# Patient Record
Sex: MALE | Race: WHITE | NOT HISPANIC OR LATINO | Employment: FULL TIME | URBAN - NONMETROPOLITAN AREA
[De-identification: names, ages, dates, MRNs, and addresses within clinical notes are randomized per-mention and may not be internally consistent; named-entity substitution may affect disease eponyms.]

---

## 2017-05-04 ENCOUNTER — COMMUNICATION - GICH (OUTPATIENT)
Dept: FAMILY MEDICINE | Facility: OTHER | Age: 52
End: 2017-05-04

## 2017-05-04 DIAGNOSIS — J30.2 OTHER SEASONAL ALLERGIC RHINITIS: ICD-10-CM

## 2017-06-09 ENCOUNTER — COMMUNICATION - GICH (OUTPATIENT)
Dept: FAMILY MEDICINE | Facility: OTHER | Age: 52
End: 2017-06-09

## 2017-06-09 DIAGNOSIS — Z91.09 OTHER ALLERGY STATUS, OTHER THAN TO DRUGS AND BIOLOGICAL SUBSTANCES: ICD-10-CM

## 2017-09-07 ENCOUNTER — COMMUNICATION - GICH (OUTPATIENT)
Dept: FAMILY MEDICINE | Facility: OTHER | Age: 52
End: 2017-09-07

## 2017-09-07 DIAGNOSIS — Z91.09 OTHER ALLERGY STATUS, OTHER THAN TO DRUGS AND BIOLOGICAL SUBSTANCES: ICD-10-CM

## 2017-10-06 ENCOUNTER — AMBULATORY - GICH (OUTPATIENT)
Dept: FAMILY MEDICINE | Facility: OTHER | Age: 52
End: 2017-10-06

## 2017-10-06 DIAGNOSIS — Z23 ENCOUNTER FOR IMMUNIZATION: ICD-10-CM

## 2017-11-19 ENCOUNTER — COMMUNICATION - GICH (OUTPATIENT)
Dept: FAMILY MEDICINE | Facility: OTHER | Age: 52
End: 2017-11-19

## 2017-11-19 DIAGNOSIS — I10 ESSENTIAL (PRIMARY) HYPERTENSION: ICD-10-CM

## 2017-11-19 DIAGNOSIS — E78.5 HYPERLIPIDEMIA: ICD-10-CM

## 2017-11-30 ENCOUNTER — COMMUNICATION - GICH (OUTPATIENT)
Dept: FAMILY MEDICINE | Facility: OTHER | Age: 52
End: 2017-11-30

## 2017-11-30 DIAGNOSIS — Z91.09 OTHER ALLERGY STATUS, OTHER THAN TO DRUGS AND BIOLOGICAL SUBSTANCES: ICD-10-CM

## 2017-12-12 ENCOUNTER — HISTORY (OUTPATIENT)
Dept: FAMILY MEDICINE | Facility: OTHER | Age: 52
End: 2017-12-12

## 2017-12-12 ENCOUNTER — OFFICE VISIT - GICH (OUTPATIENT)
Dept: FAMILY MEDICINE | Facility: OTHER | Age: 52
End: 2017-12-12

## 2017-12-12 DIAGNOSIS — E78.5 HYPERLIPIDEMIA: ICD-10-CM

## 2017-12-12 DIAGNOSIS — R35.0 FREQUENCY OF MICTURITION: ICD-10-CM

## 2017-12-12 DIAGNOSIS — Z23 ENCOUNTER FOR IMMUNIZATION: ICD-10-CM

## 2017-12-12 DIAGNOSIS — I10 ESSENTIAL (PRIMARY) HYPERTENSION: ICD-10-CM

## 2017-12-12 DIAGNOSIS — Z12.5 ENCOUNTER FOR SCREENING FOR MALIGNANT NEOPLASM OF PROSTATE: ICD-10-CM

## 2017-12-12 DIAGNOSIS — Z91.09 OTHER ALLERGY STATUS, OTHER THAN TO DRUGS AND BIOLOGICAL SUBSTANCES: ICD-10-CM

## 2017-12-12 LAB
ANION GAP - HISTORICAL: 8 (ref 5–18)
BUN SERPL-MCNC: 12 MG/DL (ref 7–25)
BUN/CREAT RATIO - HISTORICAL: 14
CALCIUM SERPL-MCNC: 9.1 MG/DL (ref 8.6–10.3)
CHLORIDE SERPLBLD-SCNC: 105 MMOL/L (ref 98–107)
CHOL/HDL RATIO - HISTORICAL: 2.32
CHOLESTEROL TOTAL: 195 MG/DL
CO2 SERPL-SCNC: 26 MMOL/L (ref 21–31)
CREAT SERPL-MCNC: 0.88 MG/DL (ref 0.7–1.3)
GFR IF NOT AFRICAN AMERICAN - HISTORICAL: >60 ML/MIN/1.73M2
GLUCOSE SERPL-MCNC: 94 MG/DL (ref 70–105)
HDLC SERPL-MCNC: 84 MG/DL (ref 23–92)
LDLC SERPL CALC-MCNC: 96 MG/DL
NON-HDL CHOLESTEROL - HISTORICAL: 111 MG/DL
POTASSIUM SERPL-SCNC: 4.1 MMOL/L (ref 3.5–5.1)
PROVIDER ORDERDED STATUS - HISTORICAL: NORMAL
PSA TOTAL (DIAGNOSTIC) - HISTORICAL: 1.73 NG/ML
SODIUM SERPL-SCNC: 139 MMOL/L (ref 133–143)
TRIGL SERPL-MCNC: 74 MG/DL

## 2017-12-19 ENCOUNTER — COMMUNICATION - GICH (OUTPATIENT)
Dept: FAMILY MEDICINE | Facility: OTHER | Age: 52
End: 2017-12-19

## 2017-12-28 NOTE — TELEPHONE ENCOUNTER
Patient Information     Patient Name MRN Deandre Miller [id number] Male 1965      Telephone Encounter by Irineo Hearn RN at 2017  4:38 PM     Author:  Irineo Hearn RN Service:  (none) Author Type:  NURS- Registered Nurse     Filed:  2017  4:40 PM Encounter Date:  2017 Status:  Signed     :  Irineo Hearn RN (NURS- Registered Nurse)            Asthma/COPD    Office visit in the past 12 months.    Last visit with LETTY CRENSHAW was on: 10/28/2016 in [facility]  Next visit with LETTY CRENSHAW is on: 2017 in [facility]  Next visit with Family Practice is on: 2017 in [facility]    Max refills 12 months from last office visit.    Chart review shows that patient is being seen for annual medication management office visit with PCP on 17. Writer will refill rx as requested to get patient through his appointment date as noted.    Prescription refilled per RN Medication Refill Policy.................... Irineo Hearn RN ....................  2017   4:39 PM

## 2017-12-28 NOTE — TELEPHONE ENCOUNTER
Patient Information     Patient Name MRN Deandre Miller [id number] Male 1965      Telephone Encounter by Irineo Hearn RN at 2017  8:21 AM     Author:  Irineo Hearn RN Service:  (none) Author Type:  NURS- Registered Nurse     Filed:  2017  8:25 AM Encounter Date:  2017 Status:  Signed     :  Irineo Hearn RN (NURS- Registered Nurse)            Asthma/COPD    Office visit in the past 12 months.    Last visit with LETTY CRENSHAW was on: 10/28/2016 in [facility]  Next visit with LETTY CRENSHAW is on: No future appointment listed with this provider  Next visit with Family Practice is on: No future appointment listed in this department    Max refills 12 months from last office visit.    Chart review shows that patient was seen by PCP last on 10/28/16 for a physical. Use of singulair was noted at that office visit with PCP, as well as continued use as rx was renewed at that time. Writer will refill rx as requested at this time.    Prescription refilled per RN Medication Refill Policy.................... Irineo Hearn RN ....................  2017   8:24 AM

## 2017-12-28 NOTE — TELEPHONE ENCOUNTER
Patient Information     Patient Name MRN Deandre Miller [id number] Male 1965      Telephone Encounter by Irineo Carreon RN at 2017  3:51 PM     Author:  Irineo Carreon RN Service:  (none) Author Type:  NURS- Registered Nurse     Filed:  2017  3:54 PM Encounter Date:  2017 Status:  Signed     :  Irineo Carreon RN (NURS- Registered Nurse)            Redundant Refill Request for Singulair refused;    Prescribing Provider: Ayush Gillette MD             Order Date: 2017  Ordered by: IRINEO CARREON  Medication:montelukast (SINGULAIR) 10 mg tablet    Qty:90 tablet   Ref:1  Start:2017    End:              Route:Oral                  RHIANNON:No   Class:eRx    Sig:Take 1 tablet by mouth at bedtime.    Pharmacy:[id number] ([facility]) [address]              [address]    Cosign accepted by AYUSH GILLETTE[[id number]] on 2017  8:44 AM    Unable to complete prescription refill per RN Medication Refill Policy.................... Irineo Carreon RN ....................  2017   3:51 PM

## 2017-12-28 NOTE — TELEPHONE ENCOUNTER
Patient Information     Patient Name MRDeandre Dawson [id number] Male 1965      Telephone Encounter by Kendra Coy RN at 2017  3:09 PM     Author:  Kendra Coy RN Service:  (none) Author Type:  NURS- Registered Nurse     Filed:  2017  3:10 PM Encounter Date:  2017 Status:  Signed     :  Kendra Coy RN (NURS- Registered Nurse)            Statins    Office visit in the past 12 months.    Last visit with LETTY CRENSHAW was on: [facility]/28/[facility]  Next visit with LETTY CRENSHAW is on: No future appointment listed with this provider  Next visit with [facility] is on: No future appointment listed in this department    Lab testing requirements:  Lipids annually.  Repeat lipids 6-8 weeks after dosage or drug change.    Last Lipids:  Chol: 198    10/28/2016  T    10/28/2016  HDL:   78    10/28/2016  LDL:  105    10/28/2016  LDL DIRECT:  No results found in past 5 years    .    Concommitant use of fibrates and statins-If it is an addition to the medication list, review note and/or discuss with provider.  If already on medication list, refill.    Max refills 12 months from last office visit.      Beta Blockers     Office visit in the past 12 months or per provider note.    Last visit with LETTY CRENSHAW was on: 10/28/2016 in [facility]  Next visit with LETTY CRENSHAW is on: No future appointment listed with this provider  Next visit with [facility] is on: No future appointment listed in this department    Max refill for 12 months from last office visit or per provider note.      Due for exam and annual labs.  Limited refill per protocol and letter mailed.  KENDRA COY RN ........   2017    3:09 PM

## 2018-01-04 NOTE — TELEPHONE ENCOUNTER
Patient Information     Patient Name MRN Deandre Miller [id number] Male 1965      Telephone Encounter by Irineo Hearn RN at 2017  1:39 PM     Author:  Irineo Hearn RN Service:  (none) Author Type:  NURS- Registered Nurse     Filed:  2017  1:50 PM Encounter Date:  2017 Status:  Signed     :  Irineo Hearn RN (NURS- Registered Nurse)            Antihistamines:    Office visit in the past 12 months.    Last visit with LETTY CRENSHAW was on: 10/28/2016 in [facility]  Next visit with LETTY CRENSHAW is on: No future appointment listed with this provider  Next visit with Family Practice is on: No future appointment listed in this department    Max refills 12 months from last office visit.    Chart review shows that last office visit with PCP was noted to be on 10/28/16 for a physical. Medications, including claritin were reviewed by PCP at that office visit. No noted change with regards to claritin noted. Will refill rx as requested.    Prescription refilled per RN Medication Refill Policy.................... Irineo Hearn RN ....................  2017   1:49 PM

## 2018-01-17 PROBLEM — G47.30 SLEEP APNEA: Status: ACTIVE | Noted: 2018-01-17

## 2018-01-17 RX ORDER — METOPROLOL SUCCINATE 100 MG/1
100 TABLET, EXTENDED RELEASE ORAL
COMMUNITY
Start: 2017-12-15 | End: 2018-12-14

## 2018-01-17 RX ORDER — MONTELUKAST SODIUM 10 MG/1
10 TABLET ORAL
COMMUNITY
Start: 2017-12-12 | End: 2018-11-14

## 2018-01-17 RX ORDER — SIMVASTATIN 10 MG
10 TABLET ORAL
COMMUNITY
Start: 2017-12-12 | End: 2018-12-14

## 2018-01-17 RX ORDER — ASPIRIN 81 MG/1
81 TABLET ORAL
COMMUNITY
Start: 2013-01-18

## 2018-01-17 RX ORDER — TAMSULOSIN HYDROCHLORIDE 0.4 MG/1
0.4 CAPSULE ORAL
COMMUNITY
Start: 2017-12-19 | End: 2018-12-14

## 2018-02-09 VITALS
SYSTOLIC BLOOD PRESSURE: 142 MMHG | HEIGHT: 72 IN | WEIGHT: 250 LBS | HEART RATE: 76 BPM | BODY MASS INDEX: 33.86 KG/M2 | DIASTOLIC BLOOD PRESSURE: 90 MMHG

## 2018-02-12 NOTE — PROGRESS NOTES
Patient Information     Patient Name MRN Sex Deandre Hobbs [id number] Male 1965      Progress Notes by Ayush Gillette MD at 2017  8:30 AM     Author:  Ayush Gillette MD Service:  (none) Author Type:  Physician     Filed:  12/15/2017  6:56 AM Encounter Date:  2017 Status:  Signed     :  Ayush Gillette MD (Physician)            SUBJECTIVE:    Deandre Smyth is a 52 y.o. male who presents for physical exam    HPI: Patient reports that he feels well. He is here to follow-up on hypertension, hyperlipidemia and allergies. He has not had any chest pain. He denies any shortness of breath. He reports no exertional indigestion or other anginal equivalents.    He has noticed a little bit of urinary hesitancy only in the morning. No nocturia. No frequency during the day. No incontinence.    PROBLEM LIST:  Patient Active Problem List     Diagnosis  Code     ESSENTIAL HYPERTENSION I10     DERMATITIS, PERIORAL L71.9     ALLERGIC RHINITIS J30.9     SLEEP APNEA G47.30     Hyperlipidemia LDL goal < 130 E78.5     PAST MEDICAL HISTORY:  Past Medical History:     Diagnosis  Date     Rotator cuff tear, left      Sleep apnea      SURGICAL HISTORY:  Past Surgical History:      Procedure  Laterality Date     COLONOSCOPY SCREENING  2010    normal; follow up 10 years       SHOULDER ARTHROSCOPY  04    Right shoulder surgery, due to biceps tendon tear, Dr. Burden        SHOULDER ARTHROSCOPY  2010    Left shoulder surgery- Sukhdeep.         SOCIAL HISTORY:  Social History     Social History        Marital status:       Spouse name: N/A     Number of children:  N/A     Years of education:  N/A     Occupational History      Not on file.     Social History Main Topics        Smoking status:  Never Smoker     Smokeless tobacco:  Current User     Types: Chew     Alcohol use  Not on file     Drug use:  Not on file     Sexual activity:  Not on file     Other Topics  Concern     Not on  file      Social History Narrative     .  Nonsmoker.      Occasional alcohol, couple times a week has a couple beers.      No drug use.    .  Has 24y/o son and 21y/o daughter.             FAMILY HISTORY:  Family History       Problem   Relation Age of Onset     Other  Mother      Alzheimer's disease,  at age 75       Other  Father      BPH, cancer of the prostate in his seventies.       Hypertension  Father      Sepsis after surgery. - 81 y/o       No Known Problems  Sister      No Known Problems  Brother      Heart Disease  No Family History      No known premature CAD or DM.       Diabetes  No Family History      No known premature CAD or DM.        CURRENT MEDICATIONS:   Current Outpatient Prescriptions       Medication  Sig Dispense Refill     aspirin enteric coated 81 mg tablet Take 1 tablet by mouth once daily with a meal.  0     CPAP CPAP tubing. Selligy REMstar pro c-flex 1 unit 0     metoprolol succinate (TOPROL XL) 50 mg sustained-release tablet Take 1 tablet by mouth once daily. 90 tablet 3     montelukast (SINGULAIR) 10 mg tablet Take 1 tablet by mouth at bedtime. 90 tablet 3     simvastatin (ZOCOR) 10 mg tablet Take 1 tablet by mouth at bedtime. 90 tablet 3     No current facility-administered medications for this visit.      Medications have been reviewed by me and are current to the best of my knowledge and ability.    ALLERGIES:  Pollen extracts    REVIEW OF SYSTEMS:  General: denies any general problems.  Eyes: denies problems  Ears/Nose/Throat: denies problems  Cardiovascular: denies problems  Respiratory: denies problems  Gastrointestinal: denies problems  Genitourinary: See history of present illness  Musculoskeletal: denies problems  Skin: denies problems  Neurologic: denies problems  Psychiatric: denies problems  Endocrine: denies problems  Heme/Lymphatic: denies problems  Allergic/Immunologic: denies problems  PHQ Depression Screening 2017    Date of PHQ exam (doc flow) 12/12/2017   1. Lack of interest/pleasure 0 - Not at all   2. Feeling down/depressed 0 - Not at all   PHQ-2 TOTAL SCORE 0   3. Trouble sleeping -   4. Decreased energy -   5. Appetite change -   6. Feelings of failure -   7. Trouble concentrating -   8. Activity level -   9. Hurting yourself -   PHQ-9 TOTAL SCORE -   PHQ-9 Severity Level -   Some recent data might be hidden       OBJECTIVE:  /90  Pulse 76  Ht 1.829 m (6')  Wt 113.4 kg (250 lb)  BMI 33.91 kg/m2  EXAM:   General Appearance: Pleasant, alert, appropriate appearance for age. No acute distress  Head Exam: Normal. Normocephalic, atraumatic.  Eye Exam:  Normal external eye, conjunctiva, lids, cornea. LICO.  Fundoscopic Exam: Normal red reflex and fundoscopic exam.  Ear Exam: Normal TM's bilaterally. Normal auditory canals and external ears. Non-tender.  Nose Exam: Normal external nose, mucus membranes, and septum.  OroPharynx Exam:  Dental hygiene adequate. Normal buccal mucosa. Normal pharynx.  Neck Exam:  Supple, no masses or nodes.  Thyroid Exam: No nodules or enlargement.  Chest/Respiratory Exam: Normal chest wall and respirations. Clear to auscultation.  Cardiovascular Exam: Regular rate and rhythm. S1, S2, no murmur, click, gallop, or rubs.  Gastrointestinal Exam: Soft, non-tender, no masses or organomegaly.  Rectal Exam: Patient declined and understands risks of this approach  Lymphatic Exam: Non-palpable nodes in neck, clavicular, axillary, or inguinal regions.  Musculoskeletal Exam: Back is straight and non-tender, full ROM of upper and lower extremities.  Foot Exam: Left and right foot: good pedal pulses, no lesions, nail hygiene good.  Skin: no rash or abnormalities  Neurologic Exam: Nonfocal, symmetric DTRs, normal gross motor, tone coordination and no tremor.  Psychiatric Exam: Alert and oriented - appropriate affect.    Results for orders placed or performed in visit on 12/12/17      LIPID PANEL       Result  Value Ref Range    CHOLESTEROL,TOTAL 195 <200 mg/dL    TRIGLYCERIDES 74 <150 mg/dL    HDL CHOLESTEROL 84 23 - 92 mg/dL    NON-HDL CHOLESTEROL 111 <145 mg/dl    CHOL/HDL RATIO            2.32 <4.50                    LDL CHOLESTEROL 96 <100 mg/dL    PROVIDER ORDERED STATUS FASTING    BASIC METABOLIC PANEL      Result  Value Ref Range    SODIUM 139 133 - 143 mmol/L    POTASSIUM 4.1 3.5 - 5.1 mmol/L    CHLORIDE 105 98 - 107 mmol/L    CO2,TOTAL 26 21 - 31 mmol/L    ANION GAP 8 5 - 18                    GLUCOSE 94 70 - 105 mg/dL    CALCIUM 9.1 8.6 - 10.3 mg/dL    BUN 12 7 - 25 mg/dL    CREATININE 0.88 0.70 - 1.30 mg/dL    BUN/CREAT RATIO           14                    GFR if African American >60 >60 ml/min/1.73m2    GFR if not African American >60 >60 ml/min/1.73m2   PSA, TOTAL      Result  Value Ref Range    PSA TOTAL (DIAGNOSTIC) 1.733 <=3.100 ng/mL         ASSESSMENT/PLAN:    ICD-10-CM    1. Prostate cancer screening Z12.5 PSA, TOTAL      PSA, TOTAL - Discussed risks of false positives and benefits of early detection with LESLIE and PSA. Suggest both  modalities.  He declines LESLIE but does accept PSA.  He understands limitations of this approach and that about 5% of prostate cancers do not produce PSA, making them undetectable by this method alone.  He voiced good understanding.  PSA did return reassuring. His urinary hesitancy only in the morning could be related to prostate but not necessarily. If it is worsening or he develops any new symptoms he'll come back in for reevaluation     2. Essential hypertension I10 BASIC METABOLIC PANEL      BASIC METABOLIC PANEL      metoprolol succinate (TOPROL XL) 100 mg Sustained-Release tablet - we'll increase Toprol to 100 mg daily. Patient will come back in for nursing blood pressure check       DISCONTINUED: metoprolol succinate (TOPROL XL) 50 mg sustained-release tablet   3. Environmental allergies Z91.09 montelukast (SINGULAIR) 10 mg tablet - continue Singulair    4.  Hyperlipidemia with target LDL less than 130 E78.5 simvastatin (ZOCOR) 10 mg tablet      LIPID PANEL      LIPID PANEL   5. Need for prophylactic vaccination with combined diphtheria-tetanus-pertussis (DTaP) vaccine Z23 TDAP VACCINE IM      VT ADMIN VACC INITIAL     Physical exam - patient overall is doing quite well. Suggest cessation from chewing tobacco. Patient also would benefit from goal weight loss of about 25 pounds over the next 6 months. Stressed importance of low-salt diet and regular exercise to help control blood pressure. Continue on current medications as noted above.  Repeat colonoscopy in 2020.      BP Readings from Last 1 Encounters:12/12/17 : 142/90  Mr. Umanzor blood pressure is out of the normal range for adults. Per JNC-8 guidelines normal adult blood pressure is < 120/80, pre-hypertensive is between 120/80 and 139/89, and hypertension is 140/90 or greater. Risks of hypertension were discussed. Patient's strategy will be weight loss, increased activity, reduced salt intake and to recheck blood pressure in 1 months

## 2018-02-12 NOTE — TELEPHONE ENCOUNTER
Patient Information     Patient Name MRN Sex Deandre Hobbs [id number] Male 1965      Telephone Encounter by Ayush Gillette MD at 2017 12:14 PM     Author:  Ayush Gillette MD Service:  (none) Author Type:  Physician     Filed:  2017 12:14 PM Encounter Date:  2017 Status:  Signed     :  Ayush Gillette MD (Physician)            Rx sent

## 2018-02-12 NOTE — ADDENDUM NOTE
Patient Information     Patient Name MRN Deandre Miller [id number] Male 1965      Addendum Note by Ayush Crenshaw MD at 2017 12:13 PM     Author:  Ayush Crenshaw MD Service:  (none) Author Type:  Physician     Filed:  2017 12:13 PM Encounter Date:  2017 Status:  Signed     :  Ayush Crenshaw MD (Physician)       Addended by: AYUSH CRENSHAW on: 2017 12:13 PM        Modules accepted: Orders

## 2018-02-12 NOTE — TELEPHONE ENCOUNTER
Patient Information     Patient Name MRDeandre Dawson [id number] Male 1965      Telephone Encounter by Ann Barrera at 2017  9:20 AM     Author:  Ann Barrera Service:  (none) Author Type:  (none)     Filed:  2017  9:23 AM Encounter Date:  2017 Status:  Signed     :  Ann Barrera            Patient got a letter from  stating they were sending a prescription over to [facility] pharmacy for Flomax. They have not received the prescription at this time.

## 2018-03-22 ENCOUNTER — HOSPITAL ENCOUNTER (OUTPATIENT)
Dept: GENERAL RADIOLOGY | Facility: OTHER | Age: 53
Discharge: HOME | End: 2018-03-22
Attending: FAMILY MEDICINE | Admitting: FAMILY MEDICINE
Payer: COMMERCIAL

## 2018-03-22 ENCOUNTER — OFFICE VISIT (OUTPATIENT)
Dept: FAMILY MEDICINE | Facility: OTHER | Age: 53
End: 2018-03-22
Attending: FAMILY MEDICINE
Payer: COMMERCIAL

## 2018-03-22 VITALS
BODY MASS INDEX: 32.1 KG/M2 | HEIGHT: 73 IN | WEIGHT: 242.2 LBS | SYSTOLIC BLOOD PRESSURE: 128 MMHG | TEMPERATURE: 98.1 F | DIASTOLIC BLOOD PRESSURE: 70 MMHG

## 2018-03-22 DIAGNOSIS — R10.31 GROIN PAIN, RIGHT: Primary | ICD-10-CM

## 2018-03-22 DIAGNOSIS — R10.31 GROIN PAIN, RIGHT: ICD-10-CM

## 2018-03-22 PROCEDURE — 99213 OFFICE O/P EST LOW 20 MIN: CPT | Performed by: FAMILY MEDICINE

## 2018-03-22 PROCEDURE — 73502 X-RAY EXAM HIP UNI 2-3 VIEWS: CPT

## 2018-03-22 ASSESSMENT — ANXIETY QUESTIONNAIRES
1. FEELING NERVOUS, ANXIOUS, OR ON EDGE: NOT AT ALL
6. BECOMING EASILY ANNOYED OR IRRITABLE: NOT AT ALL
3. WORRYING TOO MUCH ABOUT DIFFERENT THINGS: NOT AT ALL
7. FEELING AFRAID AS IF SOMETHING AWFUL MIGHT HAPPEN: NOT AT ALL
IF YOU CHECKED OFF ANY PROBLEMS ON THIS QUESTIONNAIRE, HOW DIFFICULT HAVE THESE PROBLEMS MADE IT FOR YOU TO DO YOUR WORK, TAKE CARE OF THINGS AT HOME, OR GET ALONG WITH OTHER PEOPLE: NOT DIFFICULT AT ALL
2. NOT BEING ABLE TO STOP OR CONTROL WORRYING: NOT AT ALL
5. BEING SO RESTLESS THAT IT IS HARD TO SIT STILL: NOT AT ALL
GAD7 TOTAL SCORE: 0

## 2018-03-22 ASSESSMENT — ENCOUNTER SYMPTOMS
DIFFICULTY URINATING: 0
ABDOMINAL PAIN: 0
FEVER: 0

## 2018-03-22 ASSESSMENT — PATIENT HEALTH QUESTIONNAIRE - PHQ9: 5. POOR APPETITE OR OVEREATING: NOT AT ALL

## 2018-03-22 ASSESSMENT — PAIN SCALES - GENERAL: PAINLEVEL: NO PAIN (0)

## 2018-03-22 NOTE — PROGRESS NOTES
SUBJECTIVE:   Deandre Smyth is a 52 year old male who presents to clinic today for the following health issues:    HPI    Is working as a winkler.  Has noted some pains in the last 6 weeks or so in the groin.  Seems to come on on the way home, after driving for over 1 hour.  Sharp pain in right groin with getting out of the vehicle.  At times will be severe with just walking a few steps.  Will improve and barely be noticeable.  Happens 2-3 times a week.  No bulging in the area.  No know hip troubles.  No catching in the hip joint.    Patient Active Problem List    Diagnosis Date Noted     Sleep apnea 01/17/2018     Priority: Medium     Hyperlipidemia with target low density lipoprotein (LDL) cholesterol less than 130 mg/dL 09/21/2015     Priority: Medium     Allergic rhinitis 12/05/2011     Priority: Medium     Rosacea 12/09/2010     Priority: Medium     Essential hypertension 12/09/2010     Priority: Medium     Past Surgical History:   Procedure Laterality Date     ARTHROSCOPY SHOULDER      7/1/04,Right shoulder surgery, due to biceps tendon tear, Dr. Burden     ARTHROSCOPY SHOULDER      9/2010,Left shoulder surgery- Baker.     COLONOSCOPY      12/2010,normal; follow up 10 years     Social History   Substance Use Topics     Smoking status: Never Smoker     Smokeless tobacco: Current User     Types: Chew     Alcohol use Not on file     Current Outpatient Prescriptions   Medication Sig Dispense Refill     Misc. Devices KIT CPAP tubing. Lateral SV RespirGetJars REMstar pro c-flex       aspirin EC 81 MG EC tablet Take 81 mg by mouth       metoprolol succinate (TOPROL-XL) 100 MG 24 hr tablet Take 100 mg by mouth       tamsulosin (FLOMAX) 0.4 MG capsule Take 0.4 mg by mouth       simvastatin (ZOCOR) 10 MG tablet Take 10 mg by mouth       montelukast (SINGULAIR) 10 MG tablet Take 10 mg by mouth       Allergies   Allergen Reactions     Pollen Extract      Other reaction(s): Runny Nose       Review of Systems  "  Constitutional: Negative for fever.   Gastrointestinal: Negative for abdominal pain.   Genitourinary: Negative for difficulty urinating, penile pain and scrotal swelling.        OBJECTIVE:     /70 (BP Location: Right arm, Patient Position: Sitting, Cuff Size: Adult Regular)  Temp 98.1  F (36.7  C) (Temporal)  Ht 6' 0.5\" (1.842 m)  Wt 242 lb 3.2 oz (109.9 kg)  BMI 32.4 kg/m2  Body mass index is 32.4 kg/(m^2).  Physical Exam   Constitutional: He is oriented to person, place, and time. He appears well-developed and well-nourished. No distress.   Genitourinary:   Genitourinary Comments: No scrotal pain, negative hernia on valsalva.  Normal  exam   Musculoskeletal:   Right hip with near full flexion.  Significant loss of internal rotation, about 15 degrees or so.   Neurological: He is alert and oriented to person, place, and time.   Skin: Skin is warm and dry. No rash noted. He is not diaphoretic. No erythema.   Psychiatric: He has a normal mood and affect. His behavior is normal. Thought content normal.       Diagnostic Test Results:  X-ray is normal.    ASSESSMENT/PLAN:         (R10.31) Groin pain, right  (primary encounter diagnosis)  Comment: progressive  Plan: XR Hip Right 2-3 Views        I wonder if this is a labral tear or loose body of the hip.  Exam is consistent with only a loss of range of motion, no hernia noted.  Will proceed with an MRI.        Estiven Caicedo MD  [facility]    "

## 2018-03-22 NOTE — MR AVS SNAPSHOT
"              After Visit Summary   3/22/2018    Deandre Smyth    MRN: [id number]           Patient Information     Date Of Birth          1965        Visit Information        Provider Department      3/22/2018 12:45 PM Estiven Caicedo MD [facility] and [facility]        Today's Diagnoses     Groin pain, right    -  1       Follow-ups after your visit        Follow-up notes from your care team     Return if symptoms worsen or fail to improve.      Future tests that were ordered for you today     Open Future Orders        Priority Expected Expires Ordered    MR Hip Right w/o Contrast Routine  3/22/2019 3/22/2018    XR Pelvis w Hip Right G/E 2 Views Routine 3/22/2018 3/22/2019 3/22/2018            Who to contact     If you have questions or need follow up information about today's clinic visit or your schedule please contact [facility] AND [facility] directly at [phone].  Normal or non-critical lab and imaging results will be communicated to you by Urban Remedyhart, letter or phone within 4 business days after the clinic has received the results. If you do not hear from us within 7 days, please contact the clinic through [employer] Remedyhart or phone. If you have a critical or abnormal lab result, we will notify you by phone as soon as possible.  Submit refill requests through Eloqua or call your pharmacy and they will forward the refill request to us. Please allow 3 business days for your refill to be completed.          Additional Information About Your Visit        MyChart Information     Eloqua lets you send messages to your doctor, view your test results, renew your prescriptions, schedule appointments and more. To sign up, go to [email or web address] . Click on \"Log in\" on the left side of the screen, which will take you to the Welcome page. Then click on \"Sign up Now\" on the right side of the page.     You will be asked to enter the access code listed below, as well as some personal information. Please " "follow the directions to create your username and password.     Your access code is: [email or web address]  Expires: 2018  1:33 PM     Your access code will  in 90 days. If you need help or a new code, please call your [place] clinic or [phone].        Care EveryWhere ID     This is your Care EveryWhere ID. This could be used by other organizations to access your [place] medical records  [id number]        Your Vitals Were     Temperature Height BMI (Body Mass Index)             98.1  F (36.7  C) (Temporal) 6' 0.5\" (1.842 m) 32.4 kg/m2          Blood Pressure from Last 3 Encounters:   18 128/70   17 142/90   10/28/16 132/86    Weight from Last 3 Encounters:   18 242 lb 3.2 oz (109.9 kg)   17 250 lb (113.4 kg)   10/28/16 258 lb (117 kg)               Primary Care Provider Office Phone # Fax #    Ayush Gillette [phone] [address] [facility] [address]        Equal Access to Services     [facility]: Hadii piedad khano Soleydiali, waaxda luqadaha, qaybta kaalmada adeegyada, ynes martinez. So [facility] [phone].    ATENCIÓN: Si habla español, tiene a guillen disposición servicios gratuitos de asistencia lingüística. Llame al [phone].    We comply with applicable federal civil rights laws and [place] laws. We do not discriminate on the basis of race, color, national origin, age, disability, sex, sexual orientation, or gender identity.            Thank you!     Thank you for choosing [facility] AND [facility]  for your care. Our goal is always to provide you with excellent care. Hearing back from our patients is one way we can continue to improve our services. Please take a few minutes to complete the written survey that you may receive in the mail after your visit with us. Thank you!             Your Updated Medication List - Protect others around you: Learn how to safely use, store and throw away your " medicines at www.disposemymeds.org.          This list is accurate as of 3/22/18  1:33 PM.  Always use your most recent med list.                   Brand Name Dispense Instructions for use Diagnosis    aspirin EC 81 MG EC tablet      Take 81 mg by mouth        metoprolol succinate 100 MG 24 hr tablet    TOPROL-XL     Take 100 mg by mouth        Misc. Devices Kit      CPAP tubing. [employer]s REMstar pro c-flex        montelukast 10 MG tablet    SINGULAIR     Take 10 mg by mouth        simvastatin 10 MG tablet    ZOCOR     Take 10 mg by mouth        tamsulosin 0.4 MG capsule    FLOMAX     Take 0.4 mg by mouth

## 2018-03-23 ASSESSMENT — ANXIETY QUESTIONNAIRES: GAD7 TOTAL SCORE: 0

## 2018-03-30 ENCOUNTER — HOSPITAL ENCOUNTER (OUTPATIENT)
Dept: MRI IMAGING | Facility: OTHER | Age: 53
Discharge: HOME | End: 2018-03-30
Attending: FAMILY MEDICINE | Admitting: FAMILY MEDICINE
Payer: COMMERCIAL

## 2018-03-30 ENCOUNTER — HOSPITAL ENCOUNTER (OUTPATIENT)
Dept: GENERAL RADIOLOGY | Facility: OTHER | Age: 53
End: 2018-03-30
Attending: RADIOLOGY
Payer: COMMERCIAL

## 2018-03-30 DIAGNOSIS — Z98.890 HISTORY OF METAL REMOVED FROM EYE: ICD-10-CM

## 2018-03-30 DIAGNOSIS — R10.31 GROIN PAIN, RIGHT: ICD-10-CM

## 2018-03-30 PROCEDURE — 73721 MRI JNT OF LWR EXTRE W/O DYE: CPT | Mod: RT

## 2018-03-30 PROCEDURE — 70250 X-RAY EXAM OF SKULL: CPT

## 2018-04-03 ENCOUNTER — MYC MEDICAL ADVICE (OUTPATIENT)
Dept: FAMILY MEDICINE | Facility: OTHER | Age: 53
End: 2018-04-03

## 2018-04-04 ENCOUNTER — MYC MEDICAL ADVICE (OUTPATIENT)
Dept: FAMILY MEDICINE | Facility: OTHER | Age: 53
End: 2018-04-04

## 2018-04-04 DIAGNOSIS — M76.891 TENDINITIS OF RIGHT HIP: Primary | ICD-10-CM

## 2018-04-06 NOTE — TELEPHONE ENCOUNTER
I sent him a message about this directly over his Edkimo.  Did he get it?  Estiven Caicedo MD on 4/6/2018 at 8:29 AM

## 2018-04-06 NOTE — TELEPHONE ENCOUNTER
Spoke with patients wife's who confirmed his last name and birth date. They are needing nothing further with the referral patient has made an appointment with [facility].  Elaine Ames LPN 4/6/2018 3:19 PM

## 2018-04-11 ENCOUNTER — TRANSFERRED RECORDS (OUTPATIENT)
Dept: HEALTH INFORMATION MANAGEMENT | Facility: OTHER | Age: 53
End: 2018-04-11

## 2018-07-23 NOTE — PROGRESS NOTES
Patient Information     Patient Name  Deandre Smyth MRN  [id number] Sex  Male   1965      Letter by Ayush Gillette MD at      Author:  Ayush Gillette MD Service:  (none) Author Type:  (none)    Filed:   Encounter Date:  2017 Status:  (Other)         Deandre Smyth  [address] [facility] [address]    2017      Dear Mr. Smyth,      We've gotten results back from the laboratory for the samples you gave in clinic.  Things look great! PSA has actually decreased and your cholesterol is even better.  Im going to send off the flomax to see if it helps with your urination and your blood pressure both.  Contact us with any questions.    I'm sending along your actual numbers so that you will have them for your general interest and your records.       Take Care,   Ayush Gillette MD      Resulted Orders      LIPID PANEL (Collected: 2017  9:30 AM)      Result  Value Ref Range    CHOLESTEROL,TOTAL 195 <200 mg/dL    TRIGLYCERIDES 74 <150 mg/dL    HDL CHOLESTEROL 84 23 - 92 mg/dL    NON-HDL CHOLESTEROL 111 <145 mg/dl    CHOL/HDL RATIO            2.32 <4.50                    LDL CHOLESTEROL 96 <100 mg/dL    PROVIDER ORDERED STATUS FASTING    BASIC METABOLIC PANEL (Collected: 2017  9:30 AM)      Result  Value Ref Range    SODIUM 139 133 - 143 mmol/L    POTASSIUM 4.1 3.5 - 5.1 mmol/L    CHLORIDE 105 98 - 107 mmol/L    CO2,TOTAL 26 21 - 31 mmol/L    ANION GAP 8 5 - 18                    GLUCOSE 94 70 - 105 mg/dL    CALCIUM 9.1 8.6 - 10.3 mg/dL    BUN 12 7 - 25 mg/dL    CREATININE 0.88 0.70 - 1.30 mg/dL    BUN/CREAT RATIO           14                    GFR if African American >60 >60 ml/min/1.73m2    GFR if not African American >60 >60 ml/min/1.73m2   PSA, TOTAL (Collected: 2017  9:30 AM)      Result  Value Ref Range    PSA TOTAL (DIAGNOSTIC) 1.733 <=3.100 ng/mL    Narrative      The percentage of Free PSA can be used to enhance the   differentiation of prostate  cancer from benign prostatic  disease in subjects whose PSA levels are between 4.00 and  10.00 ng/mL.      The DXI PSA assay is a Chemiluminescent Assay.  Assay values obtained with different assay   methods cannot be used interchangeably due to differences  in assay methods and reagent specificity.

## 2018-07-23 NOTE — PROGRESS NOTES
Patient Information     Patient Name  Deandre Smyth MRN  [id number] Sex  Male   1965      Letter by Ayush Gillette MD at      Author:  Ayush Gillette MD Service:  (none) Author Type:  (none)    Filed:   Encounter Date:  2017 Status:  (Other)           Deandre Smyth  [address] [facility] [address]          2017    Dear Mr. Smyth:    A refill of metoprolol succinate (TOPROL XL) 50 mg sustained-release tablet and simvastatin (ZOCOR) 10 mg tablet has been called into your pharmacy.    Additional refills require an office visit with Ayush Gillette MD for annual medication managment and labs.   Please call the clinic at [phone] to schedule your appointment.    If you should require additional refills before your scheduled appointment, please contact your pharmacy and we will refill your medication until that date.      Thank you,    The Refill Nurse  [facility]

## 2018-11-14 DIAGNOSIS — J30.9 ALLERGIC RHINITIS, UNSPECIFIED SEASONALITY, UNSPECIFIED TRIGGER: Primary | ICD-10-CM

## 2018-11-14 NOTE — LETTER
November 16, 2018      Deandre Smyth  [address] [address] [facility] [address]        Dear Deandre,     This letter is to remind you that you are nearly due for your annual exam with Ayush Gillette. Your last comprehensive visit was nearly 12 months ago.    A LIMITED refill of      MONTELUKAST 10MG TABLET    has been called into your pharmacy. Additional refills require you to complete this appointment.    Please call the clinic at [phone] to schedule your appointment.    If you should require additional refills before your scheduled appointment, please contact your pharmacy and we will refill your medication until the date of your appointment.    If you are no longer seeing Ayush Gillette for primary care, please call to let us know. Doing so will remove you from our call/contact list.      Thank you for choosing [facility] and [facility] for your health care needs.    Sincerely,    Refill ISABEL  [facility]

## 2018-11-16 RX ORDER — MONTELUKAST SODIUM 10 MG/1
TABLET ORAL
Qty: 30 TABLET | Refills: 0 | Status: SHIPPED | OUTPATIENT
Start: 2018-11-16 | End: 2018-12-14

## 2018-11-16 NOTE — TELEPHONE ENCOUNTER
[facility] #[id number] sent Rx request for the following:     MONTELUKAST 10MG TABLET  TAKE 1 TABLET (10MG) BY MOUTH AT BEDTIME  Last Written Prescription Date:  12/12/17  Last Fill Quantity: 90,   # refills: 3    Last Office Visit: 3/22/18 (groin pain, [facility]), 12/12/17 (Annual Px, [facility])  Future Office visit: None.    Patient has enough to get through until 12/12/17 and will be due for annual exam, at the same time. Prescription approved per [facility] Refill Protocol for 30 days at this time. Reminder letter sent to Patient to call and schedule appointment. Alicia Dillon RN .............. 11/16/2018  7:54 AM

## 2018-12-14 ENCOUNTER — MYC MEDICAL ADVICE (OUTPATIENT)
Dept: FAMILY MEDICINE | Facility: OTHER | Age: 53
End: 2018-12-14

## 2018-12-14 ENCOUNTER — OFFICE VISIT (OUTPATIENT)
Dept: FAMILY MEDICINE | Facility: OTHER | Age: 53
End: 2018-12-14
Attending: FAMILY MEDICINE
Payer: COMMERCIAL

## 2018-12-14 VITALS
DIASTOLIC BLOOD PRESSURE: 82 MMHG | BODY MASS INDEX: 33.32 KG/M2 | RESPIRATION RATE: 18 BRPM | HEART RATE: 76 BPM | HEIGHT: 72 IN | TEMPERATURE: 97.9 F | SYSTOLIC BLOOD PRESSURE: 128 MMHG | WEIGHT: 246 LBS

## 2018-12-14 DIAGNOSIS — J30.9 ALLERGIC RHINITIS, UNSPECIFIED SEASONALITY, UNSPECIFIED TRIGGER: ICD-10-CM

## 2018-12-14 DIAGNOSIS — E78.5 HYPERLIPIDEMIA LDL GOAL <100: Primary | ICD-10-CM

## 2018-12-14 DIAGNOSIS — N40.1 BENIGN PROSTATIC HYPERPLASIA WITH URINARY FREQUENCY: ICD-10-CM

## 2018-12-14 DIAGNOSIS — I10 BENIGN ESSENTIAL HYPERTENSION: ICD-10-CM

## 2018-12-14 DIAGNOSIS — Z00.00 ROUTINE HISTORY AND PHYSICAL EXAMINATION OF ADULT: ICD-10-CM

## 2018-12-14 DIAGNOSIS — R35.0 BENIGN PROSTATIC HYPERPLASIA WITH URINARY FREQUENCY: ICD-10-CM

## 2018-12-14 LAB
ALBUMIN SERPL-MCNC: 4.7 G/DL (ref 3.5–5.7)
ALP SERPL-CCNC: 47 U/L (ref 34–104)
ALT SERPL W P-5'-P-CCNC: 30 U/L (ref 7–52)
ANION GAP SERPL CALCULATED.3IONS-SCNC: 8 MMOL/L (ref 3–14)
AST SERPL W P-5'-P-CCNC: 26 U/L (ref 13–39)
BILIRUB SERPL-MCNC: 0.5 MG/DL (ref 0.3–1)
BUN SERPL-MCNC: 14 MG/DL (ref 7–25)
CALCIUM SERPL-MCNC: 10 MG/DL (ref 8.6–10.3)
CHLORIDE SERPL-SCNC: 103 MMOL/L (ref 98–107)
CHOLEST SERPL-MCNC: 229 MG/DL
CO2 SERPL-SCNC: 30 MMOL/L (ref 21–31)
CREAT SERPL-MCNC: 0.96 MG/DL (ref 0.7–1.3)
GFR SERPL CREATININE-BSD FRML MDRD: 81 ML/MIN/1.7M2
GLUCOSE SERPL-MCNC: 99 MG/DL (ref 70–105)
HDLC SERPL-MCNC: 72 MG/DL (ref 23–92)
LDLC SERPL CALC-MCNC: 146 MG/DL
NONHDLC SERPL-MCNC: 157 MG/DL
POTASSIUM SERPL-SCNC: 4.5 MMOL/L (ref 3.5–5.1)
PROT SERPL-MCNC: 7.8 G/DL (ref 6.4–8.9)
PSA SERPL-ACNC: 2.33 NG/ML
SODIUM SERPL-SCNC: 141 MMOL/L (ref 134–144)
TRIGL SERPL-MCNC: 57 MG/DL

## 2018-12-14 PROCEDURE — 86787 VARICELLA-ZOSTER ANTIBODY: CPT | Performed by: FAMILY MEDICINE

## 2018-12-14 PROCEDURE — 80061 LIPID PANEL: CPT | Performed by: FAMILY MEDICINE

## 2018-12-14 PROCEDURE — 80053 COMPREHEN METABOLIC PANEL: CPT | Performed by: FAMILY MEDICINE

## 2018-12-14 PROCEDURE — G0103 PSA SCREENING: HCPCS | Performed by: FAMILY MEDICINE

## 2018-12-14 PROCEDURE — 99396 PREV VISIT EST AGE 40-64: CPT | Performed by: FAMILY MEDICINE

## 2018-12-14 PROCEDURE — 36415 COLL VENOUS BLD VENIPUNCTURE: CPT | Performed by: FAMILY MEDICINE

## 2018-12-14 RX ORDER — TAMSULOSIN HYDROCHLORIDE 0.4 MG/1
0.4 CAPSULE ORAL DAILY
Qty: 90 CAPSULE | Refills: 4 | Status: SHIPPED | OUTPATIENT
Start: 2018-12-14 | End: 2019-10-04

## 2018-12-14 RX ORDER — METOPROLOL SUCCINATE 100 MG/1
100 TABLET, EXTENDED RELEASE ORAL DAILY
Qty: 90 TABLET | Refills: 4 | Status: SHIPPED | OUTPATIENT
Start: 2018-12-14 | End: 2019-10-04

## 2018-12-14 RX ORDER — SIMVASTATIN 10 MG
10 TABLET ORAL AT BEDTIME
Qty: 90 TABLET | Refills: 4 | Status: SHIPPED | OUTPATIENT
Start: 2018-12-14 | End: 2018-12-18

## 2018-12-14 RX ORDER — MONTELUKAST SODIUM 10 MG/1
TABLET ORAL
Qty: 90 TABLET | Refills: 4 | Status: SHIPPED | OUTPATIENT
Start: 2018-12-14 | End: 2019-10-04

## 2018-12-14 ASSESSMENT — MIFFLIN-ST. JEOR: SCORE: 1984.88

## 2018-12-14 NOTE — NURSING NOTE
No LMP for male patient.  Medication Reconciliation: complete    Jannie Fink LPN  12/14/2018 9:57 AM

## 2018-12-15 LAB — VZV IGG SER QL IA: 4.4 AI (ref 0–0.8)

## 2018-12-16 ENCOUNTER — MYC MEDICAL ADVICE (OUTPATIENT)
Dept: FAMILY MEDICINE | Facility: OTHER | Age: 53
End: 2018-12-16

## 2018-12-18 DIAGNOSIS — E78.5 HYPERLIPIDEMIA LDL GOAL <100: Primary | ICD-10-CM

## 2018-12-18 RX ORDER — ROSUVASTATIN CALCIUM 10 MG/1
10 TABLET, COATED ORAL DAILY
Qty: 90 TABLET | Refills: 3 | Status: SHIPPED | OUTPATIENT
Start: 2018-12-18 | End: 2019-10-04

## 2019-01-14 RX ORDER — ROSUVASTATIN CALCIUM 10 MG/1
10 TABLET, COATED ORAL DAILY
Qty: 90 TABLET | Refills: 3 | Status: SHIPPED | OUTPATIENT
Start: 2019-01-14 | End: 2019-09-30 | Stop reason: ALTCHOICE

## 2019-01-14 NOTE — PROGRESS NOTES
Nursing Notes:   Jannie Fink LPN  12/14/2018 10:00 AM  Signed  Patient comes in to the clinic today for an annual physical.    Jannie Fink LPN  12/14/2018 10:00 AM  Signed  No LMP for male patient.  Medication Reconciliation: complete    Jannie Fink LPN  12/14/2018 9:57 AM        SUBJECTIVE:  Deandre Smyth is a 53 year old male who comes in today for physical/annual exam.  He has no acute concerns but is due for follow-up on hypertension, hyperlipidemia and BPH.  He reports no symptoms of chest pain, shortness of breath, exertional indigestion, numbness or pain in the arm or jaw or any abnormal diaphoresis.  He reports moderate intermittent activity but no regular exercise.  He thinks he is eating fairly well.  Has not had any significant weight loss recently.      Patient denies any recent change in his urination.    He had a normal colonoscopy in 2010.  He denies any change in caliber of stool, black or bloody stool.    Patient Active Problem List   Diagnosis     Allergic rhinitis     Rosacea     Essential hypertension     Hyperlipidemia with target low density lipoprotein (LDL) cholesterol less than 130 mg/dL     Sleep apnea       Current Outpatient Medications   Medication Sig Dispense Refill     aspirin EC 81 MG EC tablet Take 81 mg by mouth       metoprolol succinate ER (TOPROL-XL) 100 MG 24 hr tablet Take 1 tablet (100 mg) by mouth daily 90 tablet 4     Misc. Devices KIT CPAP tubing. [place] REMstar pro c-flex       montelukast (SINGULAIR) 10 MG tablet TAKE 1 TABLET (10MG) BY MOUTH AT BEDTIME 90 tablet 4     tamsulosin (FLOMAX) 0.4 MG capsule Take 1 capsule (0.4 mg) by mouth daily 90 capsule 4     rosuvastatin (CRESTOR) 10 MG tablet Take 1 tablet (10 mg) by mouth daily 90 tablet 3       Past Medical History:   Diagnosis Date     Sleep apnea     No Comments Provided     Tear of left rotator cuff     No Comments Provided       Past Surgical History:   Procedure Laterality Date  "    ARTHROSCOPY SHOULDER      04,Right shoulder surgery, due to biceps tendon tear, Dr. Burden     ARTHROSCOPY SHOULDER      2010,Left shoulder surgery- [facility].     COLONOSCOPY      2010,normal; follow up 10 years       Family History   Problem Relation Age of Onset     Other - See Comments Mother         Alzheimer's disease,  at age 75     Other - See Comments Father         BPH, cancer of the prostate in his seventies.     Hypertension Father         Hypertension,Sepsis after surgery. - 79 y/o     Other - See Comments Sister         No Known Problems     Other - See Comments Brother         No Known Problems     Heart Disease No family hx of         Heart Disease,No known premature CAD or DM.     Diabetes No family hx of         Diabetes,No known premature CAD or DM.       Social History     Social History Narrative    .  Nonsmoker.    Occasional alcohol, couple times a week has a couple beers.    No drug use.  .  Has 24y/o son and 23y/o daughter.       I have personally reviewed and updated above noted social, family and/or past medical history.    10 point ROS of systems including Constitutional, Eyes, ENT, Respiratory, Cardiovascular, Gastrointestinal, Genitourinary, Integumentary, Muscularskeletal, Psychiatric were all negative except for pertinent positives noted in my HPI.      /82   Pulse 76   Temp 97.9  F (36.6  C)   Resp 18   Ht 1.822 m (5' 11.75\")   Wt 111.6 kg (246 lb)   BMI 33.60 kg/m      Exam:  Constitutional: healthy, alert and no distress.  Overweight.  HEENT:  NCAT, EOMI, PERRLA, TMs nl.  EAC nl.  OP/NP nl. Thyroid palpated without enlargement or nodularity.  No lymphadenopathy.  Cardiovascular: Regular rate and rhythm.  No murmurs rubs or gallops heard.   No JVD. No lower extremity edema.  Respiratory: Clear to ascultation bilaterally.  No increased respiratory effort.   Gastrointestinal: Abdomen Soft, non-tender.  No masses, rebound or guarding. "   Genitourinary: Patient declined LESLIE.  Msk: No synovitis.  Muscle strength age and body habitus appropriateas well as equal and even.   Neuro: Normal gait and balance.  CN2-12 in tact.  No loss of sensation. Reflexes are symmetric.      Skin: No changing moles or concerning lesion.  No rashes.  No abnormal bruising.  Lymph:  No lymphadenopathy in axillae, supraclavicular or inguinal regions  Psychiatric: mentation appears normal and affect normal/bright      ASSESSMENT/Plan :    I personally reviewed the patients' labs     Results for orders placed or performed in visit on 12/14/18   Varicella Zoster Virus Antibody IgG   Result Value Ref Range    Varicella Zoster Virus Antibody IgG 4.4 (H) 0.0 - 0.8 AI   Comprehensive metabolic panel   Result Value Ref Range    Sodium 141 134 - 144 mmol/L    Potassium 4.5 3.5 - 5.1 mmol/L    Chloride 103 98 - 107 mmol/L    Carbon Dioxide 30 21 - 31 mmol/L    Anion Gap 8 3 - 14 mmol/L    Glucose 99 70 - 105 mg/dL    Urea Nitrogen 14 7 - 25 mg/dL    Creatinine 0.96 0.70 - 1.30 mg/dL    GFR Estimate 81 >60 mL/min/1.7m2    GFR Estimate If Black >90 >60 mL/min/1.7m2    Calcium 10.0 8.6 - 10.3 mg/dL    Bilirubin Total 0.5 0.3 - 1.0 mg/dL    Albumin 4.7 3.5 - 5.7 g/dL    Protein Total 7.8 6.4 - 8.9 g/dL    Alkaline Phosphatase 47 34 - 104 U/L    ALT 30 7 - 52 U/L    AST 26 13 - 39 U/L   Lipid Profile   Result Value Ref Range    Cholesterol 229 (H) <200 mg/dL    Triglycerides 57 <150 mg/dL    HDL Cholesterol 72 23 - 92 mg/dL    LDL Cholesterol Calculated 146 (H) <100 mg/dL    Non HDL Cholesterol 157 (H) <130 mg/dL   PSA Screen GH   Result Value Ref Range    PSA Screen 2.335 <3.100 ng/mL             1. Allergic rhinitis, unspecified seasonality, unspecified trigger  Symptoms well controlled.  Continue with Singulair.  - montelukast (SINGULAIR) 10 MG tablet; TAKE 1 TABLET (10MG) BY MOUTH AT BEDTIME  Dispense: 90 tablet; Refill: 4    2. Hyperlipidemia LDL goal <100  Patient's LDL remains  quite elevated.  Suggest transition from simvastatin to Crestor.  Also discussed importance of regular exercise and weight loss.    3. Benign prostatic hyperplasia with urinary frequency  PSA remains reassuring.  May continue with Flomax.  - tamsulosin (FLOMAX) 0.4 MG capsule; Take 1 capsule (0.4 mg) by mouth daily  Dispense: 90 capsule; Refill: 4    4. Benign essential hypertension  Blood pressure excellent and at goal.  Continue with low-salt diet.  Continue metoprolol at current dose  - metoprolol succinate ER (TOPROL-XL) 100 MG 24 hr tablet; Take 1 tablet (100 mg) by mouth daily  Dispense: 90 tablet; Refill: 4    5. Routine history and physical examination of adult  Patient was concerned about potential exposure to shingles causing him to get chickenpox as he reports he has never had it nor was he immunized.  I discussed them that we can check antibody titers.  They are checked and it appears that he is immune.  - PSA Screen GH; Future  - Lipid Profile; Future  - Comprehensive metabolic panel; Future  - Varicella Zoster Virus Antibody IgG; Future  - Varicella Zoster Virus Antibody IgG  - Comprehensive metabolic panel  - Lipid Profile  - PSA Screen GH        Discussed with patient principals of healthy eating.  Suggest focus on vegetables and lean meats.  Discussed goal weight.  Stressed importance of daily cardiovascular exercise and good sleep hygiene.      There are no Patient Instructions on file for this visit.    Ayush Gillette    This document was created using computer generated templates and voiceactivated software.

## 2019-02-15 ENCOUNTER — MEDICAL CORRESPONDENCE (OUTPATIENT)
Dept: HEALTH INFORMATION MANAGEMENT | Facility: OTHER | Age: 54
End: 2019-02-15

## 2019-09-09 ENCOUNTER — MYC MEDICAL ADVICE (OUTPATIENT)
Dept: FAMILY MEDICINE | Facility: OTHER | Age: 54
End: 2019-09-09

## 2019-09-16 NOTE — TELEPHONE ENCOUNTER
Called patient and discussed the shots and questions, but told him he should ask a doctor about the vaccines and immunity, etc for Varicella/Zoster. He scheduled an appointment to see Dr Caicedo to go over this on 10/4/19. He will likely be establishing care with [facility] since [facility] was his previous primary provider.  Cindy Freeman CMA([facility])..................9/16/2019   11:29 AM

## 2019-10-04 ENCOUNTER — OFFICE VISIT (OUTPATIENT)
Dept: FAMILY MEDICINE | Facility: OTHER | Age: 54
End: 2019-10-04
Attending: FAMILY MEDICINE
Payer: COMMERCIAL

## 2019-10-04 VITALS
TEMPERATURE: 98.6 F | DIASTOLIC BLOOD PRESSURE: 85 MMHG | SYSTOLIC BLOOD PRESSURE: 146 MMHG | RESPIRATION RATE: 16 BRPM | OXYGEN SATURATION: 98 % | HEART RATE: 78 BPM | WEIGHT: 242.8 LBS | BODY MASS INDEX: 33.99 KG/M2 | HEIGHT: 71 IN

## 2019-10-04 DIAGNOSIS — Z01.84 IMMUNITY TO VARICELLA DETERMINED BY SEROLOGIC TEST: ICD-10-CM

## 2019-10-04 DIAGNOSIS — J30.9 ALLERGIC RHINITIS, UNSPECIFIED SEASONALITY, UNSPECIFIED TRIGGER: ICD-10-CM

## 2019-10-04 DIAGNOSIS — R35.0 BENIGN PROSTATIC HYPERPLASIA WITH URINARY FREQUENCY: ICD-10-CM

## 2019-10-04 DIAGNOSIS — N40.1 BENIGN PROSTATIC HYPERPLASIA WITH URINARY FREQUENCY: ICD-10-CM

## 2019-10-04 DIAGNOSIS — M72.2 PLANTAR FASCIITIS: ICD-10-CM

## 2019-10-04 DIAGNOSIS — I10 BENIGN ESSENTIAL HYPERTENSION: Primary | ICD-10-CM

## 2019-10-04 DIAGNOSIS — E78.5 HYPERLIPIDEMIA LDL GOAL <100: ICD-10-CM

## 2019-10-04 PROCEDURE — 86787 VARICELLA-ZOSTER ANTIBODY: CPT | Mod: ZL | Performed by: FAMILY MEDICINE

## 2019-10-04 PROCEDURE — 90471 IMMUNIZATION ADMIN: CPT | Performed by: FAMILY MEDICINE

## 2019-10-04 PROCEDURE — 99214 OFFICE O/P EST MOD 30 MIN: CPT | Mod: 25 | Performed by: FAMILY MEDICINE

## 2019-10-04 PROCEDURE — 90686 IIV4 VACC NO PRSV 0.5 ML IM: CPT | Performed by: FAMILY MEDICINE

## 2019-10-04 PROCEDURE — 36415 COLL VENOUS BLD VENIPUNCTURE: CPT | Mod: ZL | Performed by: FAMILY MEDICINE

## 2019-10-04 RX ORDER — PHENOL 1.4 %
1 AEROSOL, SPRAY (ML) MUCOUS MEMBRANE DAILY
COMMUNITY
Start: 2019-07-01

## 2019-10-04 RX ORDER — TAMSULOSIN HYDROCHLORIDE 0.4 MG/1
0.4 CAPSULE ORAL DAILY
Qty: 90 CAPSULE | Refills: 4 | Status: SHIPPED | OUTPATIENT
Start: 2019-10-04 | End: 2020-09-30

## 2019-10-04 RX ORDER — KRILL/OM-3/DHA/EPA/PHOSPHO/AST 500-110 MG
1 CAPSULE ORAL DAILY
COMMUNITY
Start: 2019-07-01

## 2019-10-04 RX ORDER — CHOLECALCIFEROL (VITAMIN D3) 25 MCG
1 TABLET,CHEWABLE ORAL DAILY
COMMUNITY
Start: 2019-07-01 | End: 2020-09-30

## 2019-10-04 RX ORDER — LANOLIN ALCOHOL/MO/W.PET/CERES
1 CREAM (GRAM) TOPICAL DAILY
COMMUNITY
Start: 2019-07-01

## 2019-10-04 RX ORDER — MONTELUKAST SODIUM 10 MG/1
TABLET ORAL
Qty: 90 TABLET | Refills: 4 | Status: SHIPPED | OUTPATIENT
Start: 2019-10-04 | End: 2020-09-30

## 2019-10-04 RX ORDER — HYDROCHLOROTHIAZIDE 25 MG/1
25 TABLET ORAL DAILY
Qty: 90 TABLET | Refills: 3 | Status: SHIPPED | OUTPATIENT
Start: 2019-10-04 | End: 2020-09-30

## 2019-10-04 RX ORDER — ROSUVASTATIN CALCIUM 10 MG/1
10 TABLET, COATED ORAL DAILY
Qty: 90 TABLET | Refills: 3 | Status: SHIPPED | OUTPATIENT
Start: 2019-10-04 | End: 2020-09-14

## 2019-10-04 RX ORDER — METOPROLOL SUCCINATE 100 MG/1
100 TABLET, EXTENDED RELEASE ORAL DAILY
Qty: 90 TABLET | Refills: 4 | Status: SHIPPED | OUTPATIENT
Start: 2019-10-04 | End: 2020-09-30

## 2019-10-04 ASSESSMENT — ANXIETY QUESTIONNAIRES
5. BEING SO RESTLESS THAT IT IS HARD TO SIT STILL: NOT AT ALL
GAD7 TOTAL SCORE: 0
6. BECOMING EASILY ANNOYED OR IRRITABLE: NOT AT ALL
1. FEELING NERVOUS, ANXIOUS, OR ON EDGE: NOT AT ALL
3. WORRYING TOO MUCH ABOUT DIFFERENT THINGS: NOT AT ALL
IF YOU CHECKED OFF ANY PROBLEMS ON THIS QUESTIONNAIRE, HOW DIFFICULT HAVE THESE PROBLEMS MADE IT FOR YOU TO DO YOUR WORK, TAKE CARE OF THINGS AT HOME, OR GET ALONG WITH OTHER PEOPLE: NOT DIFFICULT AT ALL
7. FEELING AFRAID AS IF SOMETHING AWFUL MIGHT HAPPEN: NOT AT ALL
2. NOT BEING ABLE TO STOP OR CONTROL WORRYING: NOT AT ALL

## 2019-10-04 ASSESSMENT — ENCOUNTER SYMPTOMS
BACK PAIN: 0
ADENOPATHY: 0
FATIGUE: 0
SHORTNESS OF BREATH: 0

## 2019-10-04 ASSESSMENT — PAIN SCALES - GENERAL: PAINLEVEL: SEVERE PAIN (6)

## 2019-10-04 ASSESSMENT — MIFFLIN-ST. JEOR: SCORE: 1949.49

## 2019-10-04 ASSESSMENT — PATIENT HEALTH QUESTIONNAIRE - PHQ9: 5. POOR APPETITE OR OVEREATING: NOT AT ALL

## 2019-10-04 NOTE — NURSING NOTE
"Coming in for a medication check up, talk about shots and check Right heal, refill of meds    Chief Complaint   Patient presents with     Recheck Medication     talk about shots,       Initial BP (!) 146/85 (BP Location: Right arm, Patient Position: Sitting, Cuff Size: Adult Regular)   Pulse 78   Temp 98.6  F (37  C) (Tympanic)   Resp 16   Ht 1.797 m (5' 10.75\")   Wt 110.1 kg (242 lb 12.8 oz)   SpO2 98%   BMI 34.10 kg/m   Estimated body mass index is 34.1 kg/m  as calculated from the following:    Height as of this encounter: 1.797 m (5' 10.75\").    Weight as of this encounter: 110.1 kg (242 lb 12.8 oz).  Medication Reconciliation: complete    Shirley Correa, LPN  "

## 2019-10-04 NOTE — PROGRESS NOTES
SUBJECTIVE:   Deandre Smyth is a 54 year old male who presents to clinic today for the following health issues:    HPI  Hypertension, flu vaccine and right heel pain. Pain started a few weeks ago, with walking initially.  Improves with more walking.  takes about 10 minutes to get better in the AM when he gets out of bed.  Has never had this before.  Feels he has been walking differently and getting some pains in the right groin area too.  Is on his feet all day.      Has had hypertension for many years.  Has been taking the meds fine, no side effects.  Has never been on anything apart from the toprol.  No chest pain or shortness of breath at all.      Friend had shingles recently and he wants Shingrix.  Did not have varicella as a child.      Patient Active Problem List    Diagnosis Date Noted     Sleep apnea 01/17/2018     Priority: Medium     Hyperlipidemia with target low density lipoprotein (LDL) cholesterol less than 130 mg/dL 09/21/2015     Priority: Medium     Allergic rhinitis 12/05/2011     Priority: Medium     Rosacea 12/09/2010     Priority: Medium     Essential hypertension 12/09/2010     Priority: Medium     Past Surgical History:   Procedure Laterality Date     ARTHROSCOPY SHOULDER      7/1/04,Right shoulder surgery, due to biceps tendon tear, Dr. Burden     ARTHROSCOPY SHOULDER      9/2010,Left shoulder surgery- [facility].     COLONOSCOPY  12/27/2010    normal; follow up 10 years     Social History     Tobacco Use     Smoking status: Never Smoker     Smokeless tobacco: Current User     Types: Chew   Substance Use Topics     Alcohol use: Yes     Current Outpatient Medications   Medication Sig Dispense Refill     aspirin EC 81 MG EC tablet Take 81 mg by mouth       Cyanocobalamin (B-12) 2500 MCG TABS Take 1 tablet by mouth daily       hydrochlorothiazide (HYDRODIURIL) 25 MG tablet Take 1 tablet (25 mg) by mouth daily 90 tablet 3     Krill Oil (OMEGA-3) 500 MG CAPS Take 1 tablet by mouth daily        "magnesium oxide (MAG-OX) 400 (240 Mg) MG tablet Take 1 tablet by mouth daily       metoprolol succinate ER (TOPROL-XL) 100 MG 24 hr tablet Take 1 tablet (100 mg) by mouth daily 90 tablet 4     Misc. Devices KIT CPAP tubing. [employer] REMstar pro c-flex       montelukast (SINGULAIR) 10 MG tablet TAKE 1 TABLET (10MG) BY MOUTH AT BEDTIME 90 tablet 4     Multiple Vitamins-Minerals (MULTIVITAMIN ADULTS 50+) TABS Take 1 tablet by mouth daily       nabumetone (RELAFEN) 750 MG tablet Take 1 tablet (750 mg) by mouth 2 times daily 90 tablet 0     rosuvastatin (CRESTOR) 10 MG tablet Take 1 tablet (10 mg) by mouth daily 90 tablet 3     tamsulosin (FLOMAX) 0.4 MG capsule Take 1 capsule (0.4 mg) by mouth daily 90 capsule 4     Allergies   Allergen Reactions     Pollen Extract      Other reaction(s): Runny Nose       Review of Systems   Constitutional: Negative for fatigue.   Respiratory: Negative for shortness of breath.    Cardiovascular: Negative for chest pain.   Musculoskeletal: Negative for back pain.   Hematological: Negative for adenopathy.        OBJECTIVE:     BP (!) 146/85 (BP Location: Right arm, Patient Position: Sitting, Cuff Size: Adult Regular)   Pulse 78   Temp 98.6  F (37  C) (Tympanic)   Resp 16   Ht 1.797 m (5' 10.75\")   Wt 110.1 kg (242 lb 12.8 oz)   SpO2 98%   BMI 34.10 kg/m    Body mass index is 34.1 kg/m .  Physical Exam  Constitutional:       Appearance: Normal appearance.   Cardiovascular:      Rate and Rhythm: Normal rate and regular rhythm.   Pulmonary:      Effort: Pulmonary effort is normal. No respiratory distress.      Breath sounds: Normal breath sounds. No stridor.   Skin:     General: Skin is warm and dry.   Neurological:      General: No focal deficit present.      Mental Status: He is alert and oriented to person, place, and time.      Coordination: Coordination abnormal (none).     right foot with no pain on palpation over the insertion of the plantar fascia. Nit has some on " both medial and lateral aspect of insertion of fascia.       Diagnostic Test Results:  ordered    ASSESSMENT/PLAN:       (I10) Benign essential hypertension  (primary encounter diagnosis)  Comment: not at goal  Plan: metoprolol succinate ER (TOPROL-XL) 100 MG 24         hr tablet, GH-IMM- FLU VAC PRESRV FREE QUAD         SPLIT VIR > 6 MONTHS IM, hydrochlorothiazide         (HYDRODIURIL) 25 MG tablet        Added on hydrochlorothiazide, discussed with him weight loss and exercise as well.  Follow up in 6 weeks rule out so, needs labs then.    (N40.1,  R35.0) Benign prostatic hyperplasia with urinary frequency  Comment: stable  Plan: tamsulosin (FLOMAX) 0.4 MG capsule        refilled    (E78.5) Hyperlipidemia LDL goal <100  Comment: refilled  Plan: rosuvastatin (CRESTOR) 10 MG tablet             (J30.9) Allergic rhinitis, unspecified seasonality, unspecified trigger  Comment: refilled  Plan: montelukast (SINGULAIR) 10 MG tablet             (Z01.84) Immunity to varicella determined by serologic test  Comment: will need this to determine next vaccine.  If negative, then needs Varicella vaccine.  If positive then Shingrix.    Plan: Varicella Zoster Virus Antibody IgG             (M72.2) Plantar fasciitis  Comment: new  Plan: nabumetone (RELAFEN) 750 MG tablet        Advise supportive shoes, all the time.  6 weeks of NSAIDs and then follow up as needed.             Estiven Caicedo MD  [facility]

## 2019-10-05 LAB — VZV IGG SER QL IA: 4.1 AI (ref 0–0.8)

## 2019-10-05 ASSESSMENT — ANXIETY QUESTIONNAIRES: GAD7 TOTAL SCORE: 0

## 2019-10-06 ENCOUNTER — MYC MEDICAL ADVICE (OUTPATIENT)
Dept: FAMILY MEDICINE | Facility: OTHER | Age: 54
End: 2019-10-06

## 2019-10-07 NOTE — TELEPHONE ENCOUNTER
Follow up with him, I sent him a message on this already this morning.  Estiven Caicedo MD on 10/7/2019 at 8:37 AM

## 2019-10-07 NOTE — TELEPHONE ENCOUNTER
Left message to call back....................  10/7/2019   8:42 AM  Carli Chan LPN...................10/7/2019  8:42 AM

## 2019-10-08 NOTE — TELEPHONE ENCOUNTER
Patient states that he rec'd TJP message.  Lady Alexandra LPN ....................  10/8/2019   2:01 PM

## 2019-10-08 NOTE — TELEPHONE ENCOUNTER
Notified T Niki in injection room to add patient to the shingrix list per TJP.  Lady Alexandra LPN ....................  10/8/2019   2:09 PM

## 2019-10-14 ENCOUNTER — ALLIED HEALTH/NURSE VISIT (OUTPATIENT)
Dept: FAMILY MEDICINE | Facility: OTHER | Age: 54
End: 2019-10-14
Payer: COMMERCIAL

## 2019-10-14 DIAGNOSIS — Z23 NEED FOR ZOSTER VACCINATION: Primary | ICD-10-CM

## 2019-10-14 PROCEDURE — 90750 HZV VACC RECOMBINANT IM: CPT

## 2019-10-14 PROCEDURE — 90471 IMMUNIZATION ADMIN: CPT

## 2019-10-14 NOTE — PROGRESS NOTES
Immunization Documentation  Verified patient's first and last name, and . Patient stated reason for visit today is to receive Shingrix vaccine.Patient denied any concerns with previous immunizations. Allergies reviewed. VIS handout reviewed and given to patient to take home. Shingrix, first dose prepared and administered IM into right deltoid per standing order. Administration documented in IMMUNIZATIONS (see flowsheet and order for further information).  Patient encouraged to wait in lobby for 15 minutes post-injection and notify staff immediately of any reaction.       Roxanna RAYMONDN, RN on 10/14/2019 at 8:46 AM

## 2019-11-24 DIAGNOSIS — M72.2 PLANTAR FASCIITIS: ICD-10-CM

## 2019-11-27 NOTE — TELEPHONE ENCOUNTER
"[facility] requesting refill of : Relafen 750mg tablets.     Routing refill request to provider for review/approval because:    Patient seen on 10/4/2019 for Plantar Fasciitis. Per note plan was to complete 6 weeks of Relafen 750MG and then follow up as needed.    I contacted patient to follow up with him and he stated the pain has improved since his visit, but is \"still there\" after working all day on concrete floors. Patient stated that did get more supportive shoes and insoles from [employer] that are for \"Plantar Fascittis\".     Would you refill this medication for the patient or would you like patient to follow up with you? Naomie Allen RN on 11/27/2019 at 3:58 PM        "

## 2019-12-16 ENCOUNTER — ALLIED HEALTH/NURSE VISIT (OUTPATIENT)
Dept: FAMILY MEDICINE | Facility: OTHER | Age: 54
End: 2019-12-16
Attending: FAMILY MEDICINE
Payer: COMMERCIAL

## 2019-12-16 DIAGNOSIS — Z23 NEED FOR ZOSTER VACCINATION: Primary | ICD-10-CM

## 2019-12-16 PROCEDURE — 90750 HZV VACC RECOMBINANT IM: CPT

## 2019-12-16 PROCEDURE — 90471 IMMUNIZATION ADMIN: CPT

## 2019-12-16 NOTE — PROGRESS NOTES
Immunization Documentation  Verified patient's first and last name, and . Stated reason for visit today is to receive shingrix vaccine(s). Denied any concerns with previous immunizations. Allergies reviewed.  Screening Questionnaire Adult Immunization completed (see below). VIS handout(s) reviewed and given to take home. Shingrix prepared and administered IM per standing order. Administration documented in IMMUNIZATIONS (see flowsheet and order for further information). Instructed to wait in lobby for 15 minutes post-injection and notify RN immediately of any adverse reaction.     Screening Questionnaire for Adult Immunization    Are you sick today?   No   Do you have allergies to medications, food, a vaccine component, or latex?   No   Have you ever had a serious reaction after receiving a vaccination?   No   Have you received any vaccinations in the past 4 weeks?   No     Immunization questionnaire answers were all negative.      Roxanna RAYMONDN, RN on 2019 at 8:20 AM

## 2019-12-17 NOTE — PROGRESS NOTES
"Nursing Notes:   Anca Duval LPN  12/18/2019  4:00 PM  Signed  Patient presents to the clinic for annual physical.      Chief Complaint   Patient presents with     Recheck Medication       Initial BP (!) 130/92 (BP Location: Right arm, Patient Position: Sitting, Cuff Size: Adult Regular)   Pulse 68   Temp 97.7  F (36.5  C) (Tympanic)   Resp 18   Wt 111.3 kg (245 lb 6 oz)   BMI 34.47 kg/m    Estimated body mass index is 34.47 kg/m  as calculated from the following:    Height as of 10/4/19: 1.797 m (5' 10.75\").    Weight as of this encounter: 111.3 kg (245 lb 6 oz).  Medication Reconciliation: complete    Anca Duval LPN    Nursing note reviewed with patient.  Accuracy and completeness verified.   Mr. Smyth is a 54 year old male who:  Patient presents with:  Recheck Medication      ICD-10-CM    1. Annual physical exam Z00.00    2. Benign essential hypertension I10 CBC and Differential     Comprehensive Metabolic Panel   3. Mixed hyperlipidemia E78.2 Lipid Panel   4. GANESH on CPAP - uses nightly, finds helpful and beneficial G47.33     Z99.89    5. Screening PSA (prostate specific antigen) Z12.5 PSA Screen GH   6. Lateral epicondylitis of right elbow M77.11    7. Triceps tendonitis M77.9      HPI  Patient presents for annual physical and to establish care.    Overall reports doing quite well.    Hypertension, currently well controlled.  Tolerating current medication regimen.  No side effects reported.  Medication list updated.  Has refills on current meds.  Needs updated orders.  He would like to come in fasting.    Mixed hyperlipidemia, currently on Crestor 10 mg daily.  Tolerating well.  No side effects reported.  Continue current dosing.  Fasting lipid order placed.    Sleep apnea, still using CPAP regularly.  Finds helpful and beneficial.  Encouraged continued use.    Screening PSA, labs ordered.  He denies urinary symptoms at this time.    Right elbow pain.  Has some triceps tendinitis on exam.  Has " lateral epicondylitis on exam as well.  He does do some repetitive type work and construction.  He is left-handed.  Lateral compression on the forearm does help alleviate some of the tenderness.  Encouraged forearm wrap.  Advised consideration of some ice packs or Tylenol as needed.  Ibuprofen if needed.  3 forearm exercises were demonstrated.  Advised regular gentle exercise to help with his elbow.  If needed we can send a formal therapy referral.    Functional Capacity: > 4 METS.   Review of Systems   Constitutional: Negative for chills and fever.   HENT: Negative for congestion and hearing loss.    Eyes: Negative for visual disturbance.   Respiratory: Negative for cough, shortness of breath and wheezing.    Cardiovascular: Negative for chest pain and palpitations.   Gastrointestinal: Negative for abdominal pain, diarrhea, nausea and vomiting.   Endocrine: Negative for cold intolerance and heat intolerance.   Genitourinary: Negative for dysuria and hematuria.   Musculoskeletal: Negative for arthralgias and myalgias.        + right elbow pains, laterally   Skin: Negative for rash and wound.   Allergic/Immunologic: Negative for immunocompromised state.   Neurological: Negative for dizziness and light-headedness.   Hematological: Does not bruise/bleed easily.   Psychiatric/Behavioral: Negative for agitation and confusion.        Problem List/PMH: reviewed in EMR, and made relevant updates today.  Medications: reviewed in EMR, and made relevant updates today.  Allergies: reviewed in EMR, and made relevant updates today.  I reviewed family and social history and made relevant updates today.  Social History     Tobacco Use     Smoking status: Never Smoker     Smokeless tobacco: Current User     Types: Chew   Substance Use Topics     Alcohol use: Yes     Comment: 6 pack of beer per week on average     Drug use: Never      Family History   Problem Relation Age of Onset     Other - See Comments Mother         Alzheimer's  "disease,  at age 75     Other - See Comments Father         BPH, cancer of the prostate in his seventies.     Hypertension Father         Hypertension,Sepsis after surgery. - 79 y/o     Other - See Comments Sister         No Known Problems     Other - See Comments Brother         No Known Problems     Heart Disease No family hx of         Heart Disease,No known premature CAD or DM.     Diabetes No family hx of         Diabetes,No known premature CAD or DM.       EXAM:   Vitals:    19 1547   BP: 130/82   BP Location: Right arm   Patient Position: Sitting   Cuff Size: Adult Regular   Pulse: 68   Resp: 18   Temp: 97.7  F (36.5  C)   TempSrc: Tympanic   Weight: 111.3 kg (245 lb 6 oz)       Current Pain Score: No Pain (0)     BP Readings from Last 3 Encounters:   19 130/82   10/04/19 (!) 146/85   18 128/82      Wt Readings from Last 3 Encounters:   19 111.3 kg (245 lb 6 oz)   10/04/19 110.1 kg (242 lb 12.8 oz)   18 111.6 kg (246 lb)      Estimated body mass index is 34.47 kg/m  as calculated from the following:    Height as of 10/4/19: 1.797 m (5' 10.75\").    Weight as of this encounter: 111.3 kg (245 lb 6 oz).     Physical Exam  Constitutional:       General: He is not in acute distress.     Appearance: He is well-developed. He is not diaphoretic.   HENT:      Head: Normocephalic and atraumatic.   Eyes:      General: No scleral icterus.     Conjunctiva/sclera: Conjunctivae normal.   Neck:      Musculoskeletal: Neck supple.   Cardiovascular:      Rate and Rhythm: Normal rate and regular rhythm.   Pulmonary:      Effort: Pulmonary effort is normal.      Breath sounds: Normal breath sounds.   Abdominal:      Palpations: Abdomen is soft.      Tenderness: There is no abdominal tenderness.   Musculoskeletal:         General: No deformity.   Lymphadenopathy:      Cervical: No cervical adenopathy.   Skin:     General: Skin is warm and dry.      Findings: No rash.   Neurological:      General: " No focal deficit present.      Mental Status: He is alert.   Psychiatric:         Mood and Affect: Mood normal.         Behavior: Behavior normal.          Procedures   INVESTIGATIONS:  No results found for any visits on 12/18/19.    ASSESSMENT AND PLAN:  Problem List Items Addressed This Visit        Respiratory    GANESH on CPAP - uses nightly, finds helpful and beneficial       Endocrine    Mixed hyperlipidemia    Relevant Orders    Lipid Panel (Completed)       Circulatory    Benign essential hypertension    Relevant Orders    CBC and Differential (Completed)    Comprehensive Metabolic Panel (Completed)      Other Visit Diagnoses     Annual physical exam    -  Primary    Screening PSA (prostate specific antigen)        Relevant Orders    PSA Screen GH (Completed)    Lateral epicondylitis of right elbow        Triceps tendonitis            reviewed diet, exercise and weight control, recommended sodium restriction, cardiovascular risk and specific lipid/LDL goals reviewed  -- Expected clinical course discussed    -- Medications and their side effects discussed    The 10-year ASCVD risk score (Analy THAKUR Jr., et al., 2013) is: 4.8%    Values used to calculate the score:      Age: 54 years      Sex: Male      Is Non- : No      Diabetic: No      Tobacco smoker: No      Systolic Blood Pressure: 130 mmHg      Is BP treated: Yes      HDL Cholesterol: 71 mg/dL      Total Cholesterol: 178 mg/dL    Patient Instructions     Consider trying right forearm wrap / strap (one example is BAND-IT).     Start the 3 gentle forearm exercises at least a few times daily...     Schedule lab only appointment on Friday 12/20.     Immunization History   Administered Date(s) Administered     Influenza (IIV3) PF 01/13/2013, 10/06/2017     Influenza Quad, Recombinant, p-free (RIV4) 10/19/2018     Influenza Vaccine IM > 6 months Valent IIV4 10/06/2017, 10/19/2018, 10/04/2019     TDAP Vaccine (Boostrix) 12/12/2017     Td  (Adult), Adsorbed 12/09/2010     Zoster vaccine recombinant adjuvanted (SHINGRIX) 10/14/2019, 12/16/2019      -- Get your tetanus shot updated - in 2027    To help with weight loss and improve blood sugar control....    -- Try to avoid Carbohydrates as much as possible -- breads, pasta, baked goods, cakes, oatmeal, cold cereal, potatoes.   These are turned to sugar in one metabolic conversion, cause insulin secretion and increased fat deposition / weight gain.      -- Eat more lean meats, proteins, eggs, nuts, vegetables.       Return in approximately 1 year, or sooner as needed for follow-up with Dr. Fay.  - Annual Physical    Clinic : [phone]  Appointment line: [phone]     Luis Fay MD  Internal Medicine  [facility]     Portions of this note were dictated using speech recognition software. The note has been proofread but errors in the text may have been overlooked. Please contact me if there are any concerns regarding the accuracy of the dictation.

## 2019-12-18 ENCOUNTER — OFFICE VISIT (OUTPATIENT)
Dept: INTERNAL MEDICINE | Facility: OTHER | Age: 54
End: 2019-12-18
Attending: INTERNAL MEDICINE
Payer: COMMERCIAL

## 2019-12-18 VITALS
SYSTOLIC BLOOD PRESSURE: 130 MMHG | DIASTOLIC BLOOD PRESSURE: 82 MMHG | BODY MASS INDEX: 34.47 KG/M2 | HEART RATE: 68 BPM | TEMPERATURE: 97.7 F | RESPIRATION RATE: 18 BRPM | WEIGHT: 245.38 LBS

## 2019-12-18 DIAGNOSIS — M77.8 TRICEPS TENDONITIS: ICD-10-CM

## 2019-12-18 DIAGNOSIS — G47.33 OSA ON CPAP: ICD-10-CM

## 2019-12-18 DIAGNOSIS — I10 BENIGN ESSENTIAL HYPERTENSION: ICD-10-CM

## 2019-12-18 DIAGNOSIS — M77.11 LATERAL EPICONDYLITIS OF RIGHT ELBOW: ICD-10-CM

## 2019-12-18 DIAGNOSIS — Z00.00 ANNUAL PHYSICAL EXAM: Primary | ICD-10-CM

## 2019-12-18 DIAGNOSIS — E78.2 MIXED HYPERLIPIDEMIA: ICD-10-CM

## 2019-12-18 DIAGNOSIS — Z12.5 SCREENING PSA (PROSTATE SPECIFIC ANTIGEN): ICD-10-CM

## 2019-12-18 PROCEDURE — 99396 PREV VISIT EST AGE 40-64: CPT | Performed by: INTERNAL MEDICINE

## 2019-12-18 ASSESSMENT — ENCOUNTER SYMPTOMS
CONFUSION: 0
PALPITATIONS: 0
LIGHT-HEADEDNESS: 0
NAUSEA: 0
MYALGIAS: 0
ABDOMINAL PAIN: 0
DIZZINESS: 0
BRUISES/BLEEDS EASILY: 0
DYSURIA: 0
FEVER: 0
AGITATION: 0
WHEEZING: 0
HEMATURIA: 0
DIARRHEA: 0
SHORTNESS OF BREATH: 0
VOMITING: 0
ARTHRALGIAS: 0
CHILLS: 0
WOUND: 0
COUGH: 0

## 2019-12-18 ASSESSMENT — ANXIETY QUESTIONNAIRES
IF YOU CHECKED OFF ANY PROBLEMS ON THIS QUESTIONNAIRE, HOW DIFFICULT HAVE THESE PROBLEMS MADE IT FOR YOU TO DO YOUR WORK, TAKE CARE OF THINGS AT HOME, OR GET ALONG WITH OTHER PEOPLE: NOT DIFFICULT AT ALL
1. FEELING NERVOUS, ANXIOUS, OR ON EDGE: NOT AT ALL
7. FEELING AFRAID AS IF SOMETHING AWFUL MIGHT HAPPEN: NOT AT ALL
6. BECOMING EASILY ANNOYED OR IRRITABLE: NOT AT ALL
2. NOT BEING ABLE TO STOP OR CONTROL WORRYING: NOT AT ALL
5. BEING SO RESTLESS THAT IT IS HARD TO SIT STILL: NOT AT ALL
GAD7 TOTAL SCORE: 0
3. WORRYING TOO MUCH ABOUT DIFFERENT THINGS: NOT AT ALL

## 2019-12-18 ASSESSMENT — PATIENT HEALTH QUESTIONNAIRE - PHQ9
5. POOR APPETITE OR OVEREATING: NOT AT ALL
SUM OF ALL RESPONSES TO PHQ QUESTIONS 1-9: 0

## 2019-12-18 ASSESSMENT — PAIN SCALES - GENERAL: PAINLEVEL: NO PAIN (0)

## 2019-12-18 NOTE — NURSING NOTE
"Patient presents to the clinic for annual physical.      Chief Complaint   Patient presents with     Recheck Medication       Initial BP (!) 130/92 (BP Location: Right arm, Patient Position: Sitting, Cuff Size: Adult Regular)   Pulse 68   Temp 97.7  F (36.5  C) (Tympanic)   Resp 18   Wt 111.3 kg (245 lb 6 oz)   BMI 34.47 kg/m   Estimated body mass index is 34.47 kg/m  as calculated from the following:    Height as of 10/4/19: 1.797 m (5' 10.75\").    Weight as of this encounter: 111.3 kg (245 lb 6 oz).  Medication Reconciliation: complete    Anca Duval LPN    "

## 2019-12-18 NOTE — PATIENT INSTRUCTIONS
Consider trying right forearm wrap / strap (one example is BAND-IT).     Start the 3 gentle forearm exercises at least a few times daily...     Schedule lab only appointment on Friday 12/20.     Immunization History   Administered Date(s) Administered     Influenza (IIV3) PF 01/13/2013, 10/06/2017     Influenza Quad, Recombinant, p-free (RIV4) 10/19/2018     Influenza Vaccine IM > 6 months Valent IIV4 10/06/2017, 10/19/2018, 10/04/2019     TDAP Vaccine (Boostrix) 12/12/2017     Td (Adult), Adsorbed 12/09/2010     Zoster vaccine recombinant adjuvanted (SHINGRIX) 10/14/2019, 12/16/2019      -- Get your tetanus shot updated - in 2027    To help with weight loss and improve blood sugar control....    -- Try to avoid Carbohydrates as much as possible -- breads, pasta, baked goods, cakes, oatmeal, cold cereal, potatoes.   These are turned to sugar in one metabolic conversion, cause insulin secretion and increased fat deposition / weight gain.      -- Eat more lean meats, proteins, eggs, nuts, vegetables.       Return in approximately 1 year, or sooner as needed for follow-up with Dr. Fay.  - Annual Physical    Clinic : [phone]  Appointment line: [phone]

## 2019-12-19 ASSESSMENT — ANXIETY QUESTIONNAIRES: GAD7 TOTAL SCORE: 0

## 2019-12-20 DIAGNOSIS — I10 BENIGN ESSENTIAL HYPERTENSION: ICD-10-CM

## 2019-12-20 DIAGNOSIS — E78.2 MIXED HYPERLIPIDEMIA: ICD-10-CM

## 2019-12-20 DIAGNOSIS — Z12.5 SCREENING PSA (PROSTATE SPECIFIC ANTIGEN): ICD-10-CM

## 2019-12-20 LAB
ALBUMIN SERPL-MCNC: 4.7 G/DL (ref 3.5–5.7)
ALP SERPL-CCNC: 47 U/L (ref 34–104)
ALT SERPL W P-5'-P-CCNC: 30 U/L (ref 7–52)
ANION GAP SERPL CALCULATED.3IONS-SCNC: 9 MMOL/L (ref 3–14)
AST SERPL W P-5'-P-CCNC: 30 U/L (ref 13–39)
BASOPHILS # BLD AUTO: 0 10E9/L (ref 0–0.2)
BASOPHILS NFR BLD AUTO: 0.7 %
BILIRUB SERPL-MCNC: 0.4 MG/DL (ref 0.3–1)
BUN SERPL-MCNC: 19 MG/DL (ref 7–25)
CALCIUM SERPL-MCNC: 10.2 MG/DL (ref 8.6–10.3)
CHLORIDE SERPL-SCNC: 106 MMOL/L (ref 98–107)
CHOLEST SERPL-MCNC: 178 MG/DL
CO2 SERPL-SCNC: 29 MMOL/L (ref 21–31)
CREAT SERPL-MCNC: 1.07 MG/DL (ref 0.7–1.3)
DIFFERENTIAL METHOD BLD: NORMAL
EOSINOPHIL # BLD AUTO: 0.1 10E9/L (ref 0–0.7)
EOSINOPHIL NFR BLD AUTO: 2.4 %
ERYTHROCYTE [DISTWIDTH] IN BLOOD BY AUTOMATED COUNT: 11.9 % (ref 10–15)
GFR SERPL CREATININE-BSD FRML MDRD: 71 ML/MIN/{1.73_M2}
GLUCOSE SERPL-MCNC: 97 MG/DL (ref 70–105)
HCT VFR BLD AUTO: 43.6 % (ref 40–53)
HDLC SERPL-MCNC: 71 MG/DL (ref 23–92)
HGB BLD-MCNC: 14.5 G/DL (ref 13.3–17.7)
IMM GRANULOCYTES # BLD: 0 10E9/L (ref 0–0.4)
IMM GRANULOCYTES NFR BLD: 0.2 %
LDLC SERPL CALC-MCNC: 79 MG/DL
LYMPHOCYTES # BLD AUTO: 1.7 10E9/L (ref 0.8–5.3)
LYMPHOCYTES NFR BLD AUTO: 42.3 %
MCH RBC QN AUTO: 31.4 PG (ref 26.5–33)
MCHC RBC AUTO-ENTMCNC: 33.3 G/DL (ref 31.5–36.5)
MCV RBC AUTO: 94 FL (ref 78–100)
MONOCYTES # BLD AUTO: 0.5 10E9/L (ref 0–1.3)
MONOCYTES NFR BLD AUTO: 11.5 %
NEUTROPHILS # BLD AUTO: 1.8 10E9/L (ref 1.6–8.3)
NEUTROPHILS NFR BLD AUTO: 42.9 %
NONHDLC SERPL-MCNC: 107 MG/DL
PLATELET # BLD AUTO: 252 10E9/L (ref 150–450)
POTASSIUM SERPL-SCNC: 4.6 MMOL/L (ref 3.5–5.1)
PROT SERPL-MCNC: 7.2 G/DL (ref 6.4–8.9)
PSA SERPL-ACNC: 2.27 NG/ML
RBC # BLD AUTO: 4.62 10E12/L (ref 4.4–5.9)
SODIUM SERPL-SCNC: 144 MMOL/L (ref 134–144)
TRIGL SERPL-MCNC: 140 MG/DL
WBC # BLD AUTO: 4.1 10E9/L (ref 4–11)

## 2019-12-20 PROCEDURE — 80061 LIPID PANEL: CPT | Mod: ZL | Performed by: INTERNAL MEDICINE

## 2019-12-20 PROCEDURE — 80053 COMPREHEN METABOLIC PANEL: CPT | Mod: ZL | Performed by: INTERNAL MEDICINE

## 2019-12-20 PROCEDURE — G0103 PSA SCREENING: HCPCS | Mod: ZL | Performed by: INTERNAL MEDICINE

## 2019-12-20 PROCEDURE — 85025 COMPLETE CBC W/AUTO DIFF WBC: CPT | Mod: ZL | Performed by: INTERNAL MEDICINE

## 2019-12-20 PROCEDURE — 36415 COLL VENOUS BLD VENIPUNCTURE: CPT | Mod: ZL | Performed by: INTERNAL MEDICINE

## 2020-06-06 DIAGNOSIS — M72.2 PLANTAR FASCIITIS: ICD-10-CM

## 2020-06-08 NOTE — TELEPHONE ENCOUNTER
Routing refill request to provider for review/approval because:    Unsure if patient is to continue Relafen     Will route to Dr. Fay for review and consideration.    Nunu Cortes RN on 6/8/2020 at 10:16 AM

## 2020-07-25 DIAGNOSIS — M72.2 PLANTAR FASCIITIS: ICD-10-CM

## 2020-07-28 NOTE — TELEPHONE ENCOUNTER
Relafen refilled on 6/9/2020    #180 x 1 refills to [facility]    Refill request to temi Cortes RN on 7/28/2020 at 9:39 AM

## 2020-09-12 DIAGNOSIS — E78.5 HYPERLIPIDEMIA LDL GOAL <100: ICD-10-CM

## 2020-09-14 RX ORDER — ROSUVASTATIN CALCIUM 10 MG/1
10 TABLET, COATED ORAL DAILY
Qty: 90 TABLET | Refills: 3 | Status: SHIPPED | OUTPATIENT
Start: 2020-09-14 | End: 2020-09-30

## 2020-09-14 NOTE — TELEPHONE ENCOUNTER
[id number] sent Rx request for the following:      ROSUVASTATIN 10MG TABLET   Sig: Take 1 tablet (10 mg) by mouth daily       Last Prescription Date:   10/4/2019  Last Fill Qty/Refills:         90, R-3    Last Office Visit:              12/18/2019   Future Office visit:           9/30/2020      Prescription refilled per RN Medication Refill Policy.................... Satnam Knapp RN ....................  9/14/2020   1:12 PM

## 2020-09-23 ENCOUNTER — VIRTUAL VISIT (OUTPATIENT)
Dept: INTERNAL MEDICINE | Facility: OTHER | Age: 55
End: 2020-09-23
Attending: NURSE PRACTITIONER
Payer: COMMERCIAL

## 2020-09-23 DIAGNOSIS — Z20.822 EXPOSURE TO COVID-19 VIRUS: Primary | ICD-10-CM

## 2020-09-23 PROCEDURE — 99212 OFFICE O/P EST SF 10 MIN: CPT | Mod: 95 | Performed by: NURSE PRACTITIONER

## 2020-09-23 NOTE — NURSING NOTE
"No chief complaint on file.      Initial There were no vitals taken for this visit. Estimated body mass index is 34.47 kg/m  as calculated from the following:    Height as of 10/4/19: 1.797 m (5' 10.75\").    Weight as of 12/18/19: 111.3 kg (245 lb 6 oz).  Medication Reconciliation: Completed     Reginald Michel LPN  "

## 2020-09-23 NOTE — PROGRESS NOTES
"Deandre Smyth is a 55 year old male who is being evaluated via a billable telephone visit.      The patient has been notified of following:     \"This telephone visit will be conducted via a call between you and your physician/provider. We have found that certain health care needs can be provided without the need for a physical exam.  This service lets us provide the care you need with a short phone conversation.  If a prescription is necessary we can send it directly to your pharmacy.  If lab work is needed we can place an order for that and you can then stop by our lab to have the test done at a later time.    Telephone visits are billed at different rates depending on your insurance coverage. During this emergency period, for some insurers they may be billed the same as an in-person visit.  Please reach out to your insurance provider with any questions.    If during the course of the call the physician/provider feels a telephone visit is not appropriate, you will not be charged for this service.\"    Patient has given verbal consent for Telephone visit?  Yes    What phone number would you like to be contacted at? [phone]    How would you like to obtain your AVS? 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  Per company's rules, patient has to then be tested 72 hours after exposure. Last exposure was Monday 9/21/2020. This shane did report sore throat and fever today.    Review of Systems   CONSTITUTIONAL: NEGATIVE for fever, chills, change in weight  ENT/MOUTH: NEGATIVE for ear, mouth and throat problems  RESP: NEGATIVE for significant cough or SOB  CV: NEGATIVE for chest pain, palpitations or peripheral edema     Objective    "   Vitals:  No vitals were obtained today due to virtual visit.    healthy, alert and no distress  PSYCH: Alert and oriented times 3; coherent speech, normal rate and volume, able to articulate logical thoughts, able to abstract reason, no tangential thoughts, no hallucinations   or delusions  His affect is normal and pleasant  RESP: No cough, no audible wheezing, able to talk in full sentences  Remainder of exam unable to be completed due to telephone visits    Assessment & Plan     1. Exposure to COVID-19 virus  - Asymptomatic COVID-19 Virus (Coronavirus) by PCR;   - Advised to self quarantine at home until test results are returned.      Tobacco Cessation:   reports that he has never smoked. His smokeless tobacco use includes snuff.    No follow-ups on file.    Jennie Mcneill NP  [facility]    Phone call duration:  15 minutes

## 2020-09-24 ENCOUNTER — ALLIED HEALTH/NURSE VISIT (OUTPATIENT)
Dept: FAMILY MEDICINE | Facility: OTHER | Age: 55
End: 2020-09-24
Attending: NURSE PRACTITIONER
Payer: COMMERCIAL

## 2020-09-24 DIAGNOSIS — Z20.822 EXPOSURE TO COVID-19 VIRUS: ICD-10-CM

## 2020-09-24 PROCEDURE — U0003 INFECTIOUS AGENT DETECTION BY NUCLEIC ACID (DNA OR RNA); SEVERE ACUTE RESPIRATORY SYNDROME CORONAVIRUS 2 (SARS-COV-2) (CORONAVIRUS DISEASE [COVID-19]), AMPLIFIED PROBE TECHNIQUE, MAKING USE OF HIGH THROUGHPUT TECHNOLOGIES AS DESCRIBED BY CMS-2020-01-R: HCPCS | Mod: ZL | Performed by: NURSE PRACTITIONER

## 2020-09-24 PROCEDURE — C9803 HOPD COVID-19 SPEC COLLECT: HCPCS

## 2020-09-24 NOTE — NURSING NOTE
"Chief Complaint   Patient presents with     Covid 19 Testing     exosure   Patient swabbed for COVID-19 testing.  Medina Ram LPN on 9/24/2020 at 9:54 AM        Initial There were no vitals taken for this visit. Estimated body mass index is 34.47 kg/m  as calculated from the following:    Height as of 10/4/19: 1.797 m (5' 10.75\").    Weight as of 12/18/19: 111.3 kg (245 lb 6 oz).  Medication Reconciliation: complete    Medina Ram LPN  "

## 2020-09-25 LAB
SARS-COV-2 RNA SPEC QL NAA+PROBE: NOT DETECTED
SPECIMEN SOURCE: NORMAL

## 2020-09-30 ENCOUNTER — OFFICE VISIT (OUTPATIENT)
Dept: INTERNAL MEDICINE | Facility: OTHER | Age: 55
End: 2020-09-30
Attending: INTERNAL MEDICINE
Payer: COMMERCIAL

## 2020-09-30 VITALS
TEMPERATURE: 97.3 F | BODY MASS INDEX: 32.23 KG/M2 | HEART RATE: 68 BPM | WEIGHT: 238 LBS | RESPIRATION RATE: 16 BRPM | DIASTOLIC BLOOD PRESSURE: 88 MMHG | SYSTOLIC BLOOD PRESSURE: 138 MMHG | HEIGHT: 72 IN

## 2020-09-30 DIAGNOSIS — Z12.5 SCREENING PSA (PROSTATE SPECIFIC ANTIGEN): ICD-10-CM

## 2020-09-30 DIAGNOSIS — Z00.00 ANNUAL PHYSICAL EXAM: Primary | ICD-10-CM

## 2020-09-30 DIAGNOSIS — J30.9 ALLERGIC RHINITIS, UNSPECIFIED SEASONALITY, UNSPECIFIED TRIGGER: ICD-10-CM

## 2020-09-30 DIAGNOSIS — E78.2 MIXED HYPERLIPIDEMIA: ICD-10-CM

## 2020-09-30 DIAGNOSIS — R35.0 BENIGN PROSTATIC HYPERPLASIA WITH URINARY FREQUENCY: ICD-10-CM

## 2020-09-30 DIAGNOSIS — G47.33 OSA ON CPAP: ICD-10-CM

## 2020-09-30 DIAGNOSIS — M72.2 PLANTAR FASCIITIS: ICD-10-CM

## 2020-09-30 DIAGNOSIS — Z23 NEED FOR PROPHYLACTIC VACCINATION AND INOCULATION AGAINST INFLUENZA: ICD-10-CM

## 2020-09-30 DIAGNOSIS — I10 BENIGN ESSENTIAL HYPERTENSION: ICD-10-CM

## 2020-09-30 DIAGNOSIS — N40.1 BENIGN PROSTATIC HYPERPLASIA WITH URINARY FREQUENCY: ICD-10-CM

## 2020-09-30 LAB
ALBUMIN SERPL-MCNC: 4.7 G/DL (ref 3.5–5.7)
ALP SERPL-CCNC: 60 U/L (ref 34–104)
ALT SERPL W P-5'-P-CCNC: 33 U/L (ref 7–52)
ANION GAP SERPL CALCULATED.3IONS-SCNC: 10 MMOL/L (ref 3–14)
AST SERPL W P-5'-P-CCNC: 36 U/L (ref 13–39)
BILIRUB SERPL-MCNC: 0.8 MG/DL (ref 0.3–1)
BUN SERPL-MCNC: 20 MG/DL (ref 7–25)
CALCIUM SERPL-MCNC: 9.7 MG/DL (ref 8.6–10.3)
CHLORIDE SERPL-SCNC: 101 MMOL/L (ref 98–107)
CHOLEST SERPL-MCNC: 222 MG/DL
CO2 SERPL-SCNC: 31 MMOL/L (ref 21–31)
CREAT SERPL-MCNC: 1.01 MG/DL (ref 0.7–1.3)
ERYTHROCYTE [DISTWIDTH] IN BLOOD BY AUTOMATED COUNT: 12.4 % (ref 10–15)
GFR SERPL CREATININE-BSD FRML MDRD: 76 ML/MIN/{1.73_M2}
GLUCOSE SERPL-MCNC: 103 MG/DL (ref 70–105)
HCT VFR BLD AUTO: 41.5 % (ref 40–53)
HDLC SERPL-MCNC: 82 MG/DL (ref 23–92)
HGB BLD-MCNC: 13.8 G/DL (ref 13.3–17.7)
LDLC SERPL CALC-MCNC: 121 MG/DL
MCH RBC QN AUTO: 30.5 PG (ref 26.5–33)
MCHC RBC AUTO-ENTMCNC: 33.3 G/DL (ref 31.5–36.5)
MCV RBC AUTO: 92 FL (ref 78–100)
NONHDLC SERPL-MCNC: 140 MG/DL
PLATELET # BLD AUTO: 235 10E9/L (ref 150–450)
POTASSIUM SERPL-SCNC: 4.2 MMOL/L (ref 3.5–5.1)
PROT SERPL-MCNC: 7.5 G/DL (ref 6.4–8.9)
PSA SERPL-ACNC: 2.16 NG/ML
RBC # BLD AUTO: 4.52 10E12/L (ref 4.4–5.9)
SODIUM SERPL-SCNC: 142 MMOL/L (ref 134–144)
TRIGL SERPL-MCNC: 97 MG/DL
WBC # BLD AUTO: 3.1 10E9/L (ref 4–11)

## 2020-09-30 PROCEDURE — 85027 COMPLETE CBC AUTOMATED: CPT | Mod: ZL | Performed by: INTERNAL MEDICINE

## 2020-09-30 PROCEDURE — G0103 PSA SCREENING: HCPCS | Mod: ZL | Performed by: INTERNAL MEDICINE

## 2020-09-30 PROCEDURE — 84153 ASSAY OF PSA TOTAL: CPT | Mod: ZL | Performed by: INTERNAL MEDICINE

## 2020-09-30 PROCEDURE — 90471 IMMUNIZATION ADMIN: CPT | Performed by: INTERNAL MEDICINE

## 2020-09-30 PROCEDURE — 80061 LIPID PANEL: CPT | Mod: ZL | Performed by: INTERNAL MEDICINE

## 2020-09-30 PROCEDURE — 36415 COLL VENOUS BLD VENIPUNCTURE: CPT | Mod: ZL | Performed by: INTERNAL MEDICINE

## 2020-09-30 PROCEDURE — 80053 COMPREHEN METABOLIC PANEL: CPT | Mod: ZL | Performed by: INTERNAL MEDICINE

## 2020-09-30 PROCEDURE — 90686 IIV4 VACC NO PRSV 0.5 ML IM: CPT | Performed by: INTERNAL MEDICINE

## 2020-09-30 PROCEDURE — 99396 PREV VISIT EST AGE 40-64: CPT | Mod: 25 | Performed by: INTERNAL MEDICINE

## 2020-09-30 RX ORDER — METOPROLOL SUCCINATE 100 MG/1
100 TABLET, EXTENDED RELEASE ORAL DAILY
Qty: 90 TABLET | Refills: 4 | Status: SHIPPED | OUTPATIENT
Start: 2020-09-30 | End: 2021-09-30

## 2020-09-30 RX ORDER — TAMSULOSIN HYDROCHLORIDE 0.4 MG/1
0.4 CAPSULE ORAL DAILY
Qty: 90 CAPSULE | Refills: 3 | Status: SHIPPED | OUTPATIENT
Start: 2020-09-30 | End: 2021-09-30

## 2020-09-30 RX ORDER — ROSUVASTATIN CALCIUM 10 MG/1
10 TABLET, COATED ORAL DAILY
Qty: 90 TABLET | Refills: 3 | Status: SHIPPED | OUTPATIENT
Start: 2020-09-30 | End: 2021-09-30

## 2020-09-30 RX ORDER — HYDROCHLOROTHIAZIDE 25 MG/1
25 TABLET ORAL DAILY
Qty: 90 TABLET | Refills: 3 | Status: SHIPPED | OUTPATIENT
Start: 2020-09-30 | End: 2021-09-30

## 2020-09-30 RX ORDER — ROSUVASTATIN CALCIUM 10 MG/1
10 TABLET, COATED ORAL DAILY
Qty: 90 TABLET | Refills: 3 | Status: SHIPPED | OUTPATIENT
Start: 2020-09-30 | End: 2021-02-18

## 2020-09-30 RX ORDER — MONTELUKAST SODIUM 10 MG/1
10 TABLET ORAL AT BEDTIME
Qty: 90 TABLET | Refills: 3 | Status: SHIPPED | OUTPATIENT
Start: 2020-09-30 | End: 2021-09-30

## 2020-09-30 ASSESSMENT — ENCOUNTER SYMPTOMS
WOUND: 0
WHEEZING: 0
BRUISES/BLEEDS EASILY: 0
MYALGIAS: 0
LIGHT-HEADEDNESS: 0
FEVER: 0
CONFUSION: 0
ARTHRALGIAS: 1
HEMATURIA: 0
DYSURIA: 0
AGITATION: 0
PALPITATIONS: 0
APNEA: 1
CHILLS: 0
DIZZINESS: 0
ABDOMINAL PAIN: 0
NAUSEA: 0
VOMITING: 0
DIARRHEA: 0
SHORTNESS OF BREATH: 0
COUGH: 0

## 2020-09-30 ASSESSMENT — ANXIETY QUESTIONNAIRES
7. FEELING AFRAID AS IF SOMETHING AWFUL MIGHT HAPPEN: NOT AT ALL
1. FEELING NERVOUS, ANXIOUS, OR ON EDGE: NOT AT ALL
GAD7 TOTAL SCORE: 0
IF YOU CHECKED OFF ANY PROBLEMS ON THIS QUESTIONNAIRE, HOW DIFFICULT HAVE THESE PROBLEMS MADE IT FOR YOU TO DO YOUR WORK, TAKE CARE OF THINGS AT HOME, OR GET ALONG WITH OTHER PEOPLE: NOT DIFFICULT AT ALL
2. NOT BEING ABLE TO STOP OR CONTROL WORRYING: NOT AT ALL
6. BECOMING EASILY ANNOYED OR IRRITABLE: NOT AT ALL
5. BEING SO RESTLESS THAT IT IS HARD TO SIT STILL: NOT AT ALL
3. WORRYING TOO MUCH ABOUT DIFFERENT THINGS: NOT AT ALL

## 2020-09-30 ASSESSMENT — MIFFLIN-ST. JEOR: SCORE: 1942.56

## 2020-09-30 ASSESSMENT — PATIENT HEALTH QUESTIONNAIRE - PHQ9
SUM OF ALL RESPONSES TO PHQ QUESTIONS 1-9: 0
5. POOR APPETITE OR OVEREATING: NOT AT ALL

## 2020-09-30 ASSESSMENT — PAIN SCALES - GENERAL: PAINLEVEL: NO PAIN (0)

## 2020-09-30 NOTE — LETTER
September 30, 2020      Deandre Smyth  [address]  [place] [address]        Dear ,    We are writing to inform you of your test results.    Cholesterol levels have gone way up again.    Restart Crestor 10 mg daily.  Refill sent to pharmacy.    PSA/prostate cancer lab is normal.    White blood cell count is minimally suppressed.  Plan to recheck next year.    Luis Fay MD     Resulted Orders   PSA Screen GH   Result Value Ref Range    PSA Screen 2.162 <3.100 ng/mL      Comment:      The DXI Access PSAS WHO assay is a two site immunoenzymatic assay. Assay   values obtained with different assay methods cannot be used interchangeably   due to differences in assay methods and reagent specificity.     Lipid Panel   Result Value Ref Range    Cholesterol 222 (H) <200 mg/dL    Triglycerides 97 <150 mg/dL    HDL Cholesterol 82 23 - 92 mg/dL    LDL Cholesterol Calculated 121 (H) <100 mg/dL      Comment:      Above desirable:  100-129 mg/dl  Borderline High:  130-159 mg/dL  High:             160-189 mg/dL  Very high:       >189 mg/dl      Non HDL Cholesterol 140 (H) <130 mg/dL      Comment:      Above Desirable:  130-159 mg/dl  Borderline high:  160-189 mg/dl  High:             190-219 mg/dl  Very high:       >219 mg/dl     Comprehensive Metabolic Panel   Result Value Ref Range    Sodium 142 134 - 144 mmol/L    Potassium 4.2 3.5 - 5.1 mmol/L    Chloride 101 98 - 107 mmol/L    Carbon Dioxide 31 21 - 31 mmol/L    Anion Gap 10 3 - 14 mmol/L    Glucose 103 70 - 105 mg/dL    Urea Nitrogen 20 7 - 25 mg/dL    Creatinine 1.01 0.70 - 1.30 mg/dL    GFR Estimate 76 >60 mL/min/[1.73_m2]    GFR Estimate If Black >90 >60 mL/min/[1.73_m2]    Calcium 9.7 8.6 - 10.3 mg/dL    Bilirubin Total 0.8 0.3 - 1.0 mg/dL    Albumin 4.7 3.5 - 5.7 g/dL    Protein Total 7.5 6.4 - 8.9 g/dL    Alkaline Phosphatase 60 34 - 104 U/L    ALT 33 7 - 52 U/L    AST 36 13 - 39 U/L   CBC W PLT No Diff   Result Value Ref Range    WBC  3.1 (L) 4.0 - 11.0 10e9/L    RBC Count 4.52 4.4 - 5.9 10e12/L    Hemoglobin 13.8 13.3 - 17.7 g/dL    Hematocrit 41.5 40.0 - 53.0 %    MCV 92 78 - 100 fl    MCH 30.5 26.5 - 33.0 pg    MCHC 33.3 31.5 - 36.5 g/dL    RDW 12.4 10.0 - 15.0 %    Platelet Count 235 150 - 450 10e9/L       If you have any questions or concerns, please call the clinic at the number listed above.       Sincerely,        Luis Fay MD

## 2020-09-30 NOTE — PROGRESS NOTES
Nursing Notes:   Anca Duarte LPN  9/30/2020  8:12 AM  Signed  Patient presents to the clinic for physical.    Chief Complaint   Patient presents with     Physical     Imm/Inj     Flu Shot       Initial /88 (BP Location: Right arm, Patient Position: Sitting, Cuff Size: Adult Regular)   Pulse 68   Temp 97.3  F (36.3  C) (Tympanic)   Resp 16   Ht 1.829 m (6')   Wt 108 kg (238 lb)   BMI 32.28 kg/m   Estimated body mass index is 32.28 kg/m  as calculated from the following:    Height as of this encounter: 1.829 m (6').    Weight as of this encounter: 108 kg (238 lb).  Medication Reconciliation: complete    Anca Duarte LPN    Nursing note reviewed with patient.  Accuracy and completeness verified.   Mr. Smyth is a 55 year old male who:  Patient presents with:  Physical  Imm/Inj: Flu Shot      ICD-10-CM    1. Annual physical exam  Z00.00    2. Benign essential hypertension  I10 hydrochlorothiazide (HYDRODIURIL) 25 MG tablet     metoprolol succinate ER (TOPROL-XL) 100 MG 24 hr tablet     CBC W PLT No Diff     Comprehensive Metabolic Panel   3. Allergic rhinitis, unspecified seasonality, unspecified trigger  J30.9 montelukast (SINGULAIR) 10 MG tablet   4. Plantar fasciitis  M72.2 nabumetone (RELAFEN) 750 MG tablet   5. Benign prostatic hyperplasia with urinary frequency  N40.1 tamsulosin (FLOMAX) 0.4 MG capsule    R35.0    6. Mixed hyperlipidemia  E78.2 Lipid Panel   7. Screening PSA (prostate specific antigen)  Z12.5 PSA Screen GH   8. Need for prophylactic vaccination and inoculation against influenza  Z23 INFLUENZA VACCINE IM > 6 MONTHS VALENT IIV4 [63455]   9. GANESH on CPAP - uses nightly, finds helpful and beneficial  G47.33 CPAP Order for DME - ONLY FOR DME    Z99.89    10. Hyperlipidemia LDL goal <100  E78.5 rosuvastatin (CRESTOR) 10 MG tablet     HPI  Patient presents for annual physical exam.     Hypertension, doing well with current medication regimen.  Needs refills.  Check labs.  Denies  syncope or presyncope.    Allergic rhinitis, ongoing and chronic.  Doing well with Singulair.  Needs refills.    Plantar fasciitis, nabumetone has been quite helpful.  He switched his work boots.  Initially he had more pain but now he is doing better.  Still using these intermittently with food.  Needs refills.    BPH with urinary frequency, significantly improved with Flomax.  He would like to continue.  Needs refills.    Mixed hyperlipidemia, cholesterol levels have improved markedly with use of Crestor, currently 10 mg daily.    Check PSA today.    Flu shot today.    CPAP, has been using nightly.  Finds helpful and beneficial.  Unfortunately his CPAP humidifier seems to be leaking and is not maintaining a good seal or pressure anymore.  He would like to get a new parts if available otherwise a whole new CPAP machine.  This is medically necessary.  Orders printed.  He will talk with his medical supply vendor.    Functional Capacity: > 4 METS.   Review of Systems   Constitutional: Negative for chills and fever.   HENT: Negative for congestion and hearing loss.    Eyes: Negative for visual disturbance.   Respiratory: Positive for apnea (Uses CPAP nightly, finds helpful and beneficial). Negative for cough, shortness of breath and wheezing.    Cardiovascular: Negative for chest pain and palpitations.   Gastrointestinal: Negative for abdominal pain, diarrhea, nausea and vomiting.   Endocrine: Negative for cold intolerance and heat intolerance.   Genitourinary: Negative for dysuria and hematuria.   Musculoskeletal: Positive for arthralgias (occasionally some heel pain). Negative for myalgias.   Skin: Negative for rash and wound.   Allergic/Immunologic: Positive for environmental allergies. Negative for immunocompromised state.   Neurological: Negative for dizziness and light-headedness.   Hematological: Does not bruise/bleed easily.   Psychiatric/Behavioral: Negative for agitation and confusion.      Reviewed and  updated as needed this visit by Provider   Tobacco  Allergies  Meds  Problems  Med Hx  Surg Hx  Fam Hx         EXAM:   Vitals:    09/30/20 0803   BP: 138/88   BP Location: Right arm   Patient Position: Sitting   Cuff Size: Adult Regular   Pulse: 68   Resp: 16   Temp: 97.3  F (36.3  C)   TempSrc: Tympanic   Weight: 108 kg (238 lb)   Height: 1.829 m (6')       Current Pain Score: No Pain (0)     BP Readings from Last 3 Encounters:   09/30/20 138/88   12/18/19 130/82   10/04/19 (!) 146/85      Wt Readings from Last 3 Encounters:   09/30/20 108 kg (238 lb)   12/18/19 111.3 kg (245 lb 6 oz)   10/04/19 110.1 kg (242 lb 12.8 oz)      Estimated body mass index is 32.28 kg/m  as calculated from the following:    Height as of this encounter: 1.829 m (6').    Weight as of this encounter: 108 kg (238 lb).     Physical Exam  Constitutional:       General: He is not in acute distress.     Appearance: He is well-developed. He is not diaphoretic.   HENT:      Head: Normocephalic and atraumatic.   Eyes:      General: No scleral icterus.     Conjunctiva/sclera: Conjunctivae normal.   Neck:      Musculoskeletal: Neck supple.      Vascular: No carotid bruit.   Cardiovascular:      Rate and Rhythm: Normal rate and regular rhythm.      Pulses: Normal pulses.   Pulmonary:      Effort: Pulmonary effort is normal.      Breath sounds: Normal breath sounds.   Abdominal:      Palpations: Abdomen is soft.      Tenderness: There is no abdominal tenderness.   Musculoskeletal:         General: No deformity.      Right lower leg: No edema.      Left lower leg: No edema.   Lymphadenopathy:      Cervical: No cervical adenopathy.   Skin:     General: Skin is warm and dry.      Findings: No rash.   Neurological:      Mental Status: He is alert and oriented to person, place, and time. Mental status is at baseline.   Psychiatric:         Mood and Affect: Mood normal.         Behavior: Behavior normal.          Procedures   INVESTIGATIONS:  - Labs  reviewed in Epic     ASSESSMENT AND PLAN:  Problem List Items Addressed This Visit        Respiratory    Allergic rhinitis    Relevant Medications    montelukast (SINGULAIR) 10 MG tablet    GANESH on CPAP - uses nightly, finds helpful and beneficial    Relevant Orders    CPAP Order for DME - ONLY FOR DME (Completed)       Endocrine    Mixed hyperlipidemia    Relevant Medications    rosuvastatin (CRESTOR) 10 MG tablet    Other Relevant Orders    Lipid Panel       Circulatory    Benign essential hypertension    Relevant Medications    hydrochlorothiazide (HYDRODIURIL) 25 MG tablet    metoprolol succinate ER (TOPROL-XL) 100 MG 24 hr tablet    Other Relevant Orders    CBC W PLT No Diff    Comprehensive Metabolic Panel      Other Visit Diagnoses     Annual physical exam    -  Primary    Plantar fasciitis        Relevant Medications    nabumetone (RELAFEN) 750 MG tablet    Benign prostatic hyperplasia with urinary frequency        Relevant Medications    tamsulosin (FLOMAX) 0.4 MG capsule    Screening PSA (prostate specific antigen)        Relevant Orders    PSA Screen GH    Need for prophylactic vaccination and inoculation against influenza        Relevant Orders    INFLUENZA VACCINE IM > 6 MONTHS VALENT IIV4 [40166] (Completed)    Hyperlipidemia LDL goal <100        Relevant Medications    rosuvastatin (CRESTOR) 10 MG tablet        reviewed diet, exercise and weight control, recommended sodium restriction, cardiovascular risk and specific lipid/LDL goals reviewed  -- Expected clinical course discussed    -- Medications and their side effects discussed    The 10-year ASCVD risk score (Copelandmega THAKUR Jr., et al., 2013) is: 5.9%    Values used to calculate the score:      Age: 55 years      Sex: Male      Is Non- : No      Diabetic: No      Tobacco smoker: No      Systolic Blood Pressure: 138 mmHg      Is BP treated: Yes      HDL Cholesterol: 71 mg/dL      Total Cholesterol: 178 mg/dL    Patient Instructions      Current CPAP humidifier is leaking, not working correctly.   Rx printed for new CPAP device.     Updated orders for CPAP supplies ordered today.   Contact your CPAP . Notify them you were seen today for CPAP supplies orders.     Medications refilled.   Labs today.     Immunization History   Administered Date(s) Administered     Influenza (IIV3) PF 01/13/2013, 10/06/2017     Influenza Quad, Recombinant, p-free (RIV4) 10/19/2018     Influenza Vaccine IM > 6 months Valent IIV4 10/06/2017, 10/19/2018, 10/04/2019     TDAP Vaccine (Boostrix) 12/12/2017     Td (Adult), Adsorbed 12/09/2010     Zoster vaccine recombinant adjuvanted (SHINGRIX) 10/14/2019, 12/16/2019      -- Flu shot today.     -- Get your tetanus shot updated - from one of the local pharmacies, at your convenience. -- in 2027.     Pneumococcal Pneumonia vaccines (PCV 13 and PCV 23)     Pneumococcal Conjugate 13 - Valent Vaccine (One time only after age 65).     Pneumococcal 23 - Valent Vaccine -- Two doses (One before age 65 and One After)    -- repeat every 5 years in certain patient populations.     PCV 13should be given prior to PCV 23 -- THEN -- In eight weeks or more, PCV 23 can be given.   If the patient has already received PCV 23, they should not receive PCV 13 for one year.        Return in approximately 1 year, or sooner as needed for follow-up with Dr. Fay.  - Annual Follow-up / Physical     Clinic : [phone]  Appointment line: [phone]     Luis Fay MD   Internal Medicine  [facility]     Portions of this note were dictated using speech recognition software. The note has been proofread but errors in the text may have been overlooked. Please contact me if there are any concerns regarding the accuracy of the dictation.

## 2020-09-30 NOTE — NURSING NOTE
Immunization Documentation    Prior to Immunization administration, verified patients identity using patient's name and date of birth. Please see IMMUNIZATIONS  and order for additional information.  Patient / Parent instructed to remain in clinic for 15 minutes and report any adverse reaction to staff immediately.    Was entire vial of medication used? Yes  Vial/Syringe: Leighton Duarte LPN  9/30/2020   9:32 AM     Jan 16, 2027 Not Due Jan 16, 2017    Osteoporosis Screening  Completed Nov 14, 2016    Immunization - TDAP Pregnancy  Hidden     Medicare Wellness Visit Oct 5, 2018 Overdue Oct 5, 2017    IMMUNIZATION - DTaP/Tdap/Td Mar 28, 2018 Overdue Mar 28, 2008    Immunization-Influenza Aug 1, 2018 Overdue Oct 7, 2017    Hepatitis C Screening  Completed Mar 30, 2017    Pneumococcal Vaccine 65+ High/Highest Risk Mar 5, 2018 Overdue Jan 8, 2018    Immunization - Shingles Dec 17, 2014 Overdue Oct 22, [facility], 9,000 deaths are caused by AAA. You may not even know you have this problem and as many as 1 in 3 people will have a serious problem if it is not treated.   Early diagnosis allows for more effective treatment and cure. If you have a family history of AAA or are a male age 70-76 who has smoked, you are at higher risk of an AAA. Your provider can order this test, if needed. Colorectal Screening:  Â· There are many tests that are used to check for cancer of your colon and rectum. You and your provider should discuss what test is best for you and when to have it done. Options include:  Â· Screening Colonoscopy: exam of the entire colon, seen through a flexible lighted tube. Â· Flexible Sigmoidoscopy: exam of the last third (sigmoid portion) of the colon and rectum, seen through a flexible lighted tube. Â· Cologuard DNA stool test: a sample of your stool is used to screen for cancer and unseen blood in your stool. Â· Fecal Occult Blood Test: a sample of your stool is studied to find any unseen blood    Flu Shot:  Â· An immunization that helps to prevent influenza (the flu). You should get this every year. The best time to get the shot is in the fall. Pneumococcal Shot:  â¢ Vaccines are available that can help prevent pneumococcal disease, which is any type of infection caused by Streptococcus pneumoniae bacteria. Their use can prevent some cases of pneumonia, meningitis, and sepsis. There are two types of pneumococcal vaccines:   o Conjugate vaccines (PCV-13 or Prevnar 13Â®) - helps protect against the 13 types of pneumococcal bacteria that are the most common causes of serious infections in children and adults. o Polysaccharide vaccine (PPSV23 or Qbpppzsrt22Â®) - helps protect against 23 types of pneumococcal bacteria for patients who are recommended to get it. These vaccines should be given at least 12 months apart. A booster is usually not needed. Hepatitis B Shot:  Â· An immunization that helps to protect people from getting Hepatitis B. Hepatitis B is a virus that spreads through contact with infected blood or body fluids. Many people with the virus do not have symptoms.   The virus can lead to serious problems, such as liver disease. Some people are at higher risk than others. Your doctor will tell you if you need this shot. Diabetes Screening:  Â· A test to measure sugar (glucose) in your blood is called a fasting blood sugar. Fasting means you cannot have food or drink for at least 8 hours before the test. This test can detect diabetes long before you may notice symptoms. Glaucoma Screening:  Â· Glaucoma screening is performed by your eye doctor. The test measures the fluid pressure inside your eyes to determine if you have glaucoma. Hepatitis C Screening:  Â· A blood test to see if you have the hepatitis C virus. Hepatitis C attacks the liver and is a major cause of chronic liver disease. Medicare will cover a single screening for all adults born between [facility], or high risk patients (people who have injected illegal drugs or people who have had blood transfusions). High risk patients who continue to inject illegal drugs can be screened for Hepatitis C every year. Smoking and Tobacco-Use Cessation Counseling:  Â· Tobacco is the single greatest cause of disease and early death in our country today. Medication and counseling together can increase a personâs chance of quitting for good. Â· Medicare covers two quitting attempts per year, with four counseling sessions per attempt (eight sessions in a 12 month period)    Preventive Screening tests for Women    Screening Mammograms and Breast Exams:  Â· An x-ray of your breasts to check for breast cancer before you or your doctor may be able to feel it. If breast cancer is found early it can usually be treated with success. Pelvic Exams and Pap Tests:  Â· An exam to check for cervical and vaginal cancer. A Pap test is a lab test in which cells are taken from your cervix and sent to the lab to look for signs of cervical cancer. If cancer of the cervix is found early, chances for a cure are good.  Testing can generally end at age 72, or if a woman has a hysterectomy for a benign condition. Your provider may recommend more frequent testing if certain abnormal results are found. Bone Mass Measurements:  Â· A painless x-ray of your bone density to see if you are at risk for a broken bone. Bone density refers to the thickness of bones or how tightly the bone tissue is packed. Preventive Screening tests for Men    Prostate Screening:  Â· PSA - Prostate Cancer blood test.  Experts do not recommend routine screening of healthy men with no signs or symptoms of prostate disease. However, men should not ignore urinary symptoms, and should discuss their family history with their doctor. *Medicare pays for many preventive services to keep you healthy. For some of these services, you might have to pay a deductible, coinsurance, and / or copayment. The amounts vary depending on the type of services you need and the kind of Medicare health plan you have.

## 2020-09-30 NOTE — NURSING NOTE
Patient presents to the clinic for physical.    Chief Complaint   Patient presents with     Physical     Imm/Inj     Flu Shot       Initial /88 (BP Location: Right arm, Patient Position: Sitting, Cuff Size: Adult Regular)   Pulse 68   Temp 97.3  F (36.3  C) (Tympanic)   Resp 16   Ht 1.829 m (6')   Wt 108 kg (238 lb)   BMI 32.28 kg/m   Estimated body mass index is 32.28 kg/m  as calculated from the following:    Height as of this encounter: 1.829 m (6').    Weight as of this encounter: 108 kg (238 lb).  Medication Reconciliation: complete    Anca Duarte LPN

## 2020-09-30 NOTE — PATIENT INSTRUCTIONS
Current CPAP humidifier is leaking, not working correctly.   Rx printed for new CPAP device.     Updated orders for CPAP supplies ordered today.   Contact your CPAP . Notify them you were seen today for CPAP supplies orders.     Medications refilled.   Labs today.     Immunization History   Administered Date(s) Administered     Influenza (IIV3) PF 01/13/2013, 10/06/2017     Influenza Quad, Recombinant, p-free (RIV4) 10/19/2018     Influenza Vaccine IM > 6 months Valent IIV4 10/06/2017, 10/19/2018, 10/04/2019     TDAP Vaccine (Boostrix) 12/12/2017     Td (Adult), Adsorbed 12/09/2010     Zoster vaccine recombinant adjuvanted (SHINGRIX) 10/14/2019, 12/16/2019      -- Flu shot today.     -- Get your tetanus shot updated - from one of the local pharmacies, at your convenience. -- in 2027.     Pneumococcal Pneumonia vaccines (PCV 13 and PCV 23)     Pneumococcal Conjugate 13 - Valent Vaccine (One time only after age 65).     Pneumococcal 23 - Valent Vaccine -- Two doses (One before age 65 and One After)    -- repeat every 5 years in certain patient populations.     PCV 13should be given prior to PCV 23 -- THEN -- In eight weeks or more, PCV 23 can be given.   If the patient has already received PCV 23, they should not receive PCV 13 for one year.        Return in approximately 1 year, or sooner as needed for follow-up with Dr. Fay.  - Annual Follow-up / Physical     Clinic : [phone]  Appointment line: [phone]

## 2020-10-01 ASSESSMENT — ANXIETY QUESTIONNAIRES: GAD7 TOTAL SCORE: 0

## 2020-10-08 ENCOUNTER — MYC MEDICAL ADVICE (OUTPATIENT)
Dept: INTERNAL MEDICINE | Facility: OTHER | Age: 55
End: 2020-10-08

## 2020-10-08 NOTE — TELEPHONE ENCOUNTER
Results for INDIA MANRIQUEZ (MRN [id number]) as of 10/8/2020 09:56   Ref. Range 12/14/2018 10:57 12/20/2019 06:58 9/30/2020 08:41   Cholesterol Latest Ref Range: <200 mg/dL 229 (H) 178 222 (H)   HDL Cholesterol Latest Ref Range: 23 - 92 mg/dL 72 71 82   LDL Cholesterol Calculated Latest Ref Range: <100 mg/dL 146 (H) 79 121 (H)   Non HDL Cholesterol Latest Ref Range: <130 mg/dL 157 (H) 107 140 (H)     Based on the labs, would have assumed that he had stopped Crestor.   LDL went from 79 to 121.     If there were dietary/exercise changes over the summer... that would explain the lab change.     We can continue with current meds and work on lifestyle changes... otherwise we could consider increase dose of Crestor.     Luis Fay MD

## 2020-10-08 NOTE — TELEPHONE ENCOUNTER
Information from last office visit on 9-.  Mixed hyperlipidemia, cholesterol levels have improved markedly with use of Crestor, currently 10 mg daily.       Information from letter.  September 30, 2020        Deandre Smyth  [address] [facility] [id number]           Dear ,     We are writing to inform you of your test results.     Cholesterol levels have gone way up again.     Restart Crestor 10 mg daily.  Refill sent to pharmacy.     PSA/prostate cancer lab is normal.     White blood cell count is minimally suppressed.  Plan to recheck next year.     Luis Fay MD

## 2021-02-08 ENCOUNTER — MYC MEDICAL ADVICE (OUTPATIENT)
Dept: INTERNAL MEDICINE | Facility: OTHER | Age: 56
End: 2021-02-08

## 2021-02-08 DIAGNOSIS — R39.12 WEAK URINE STREAM: Primary | ICD-10-CM

## 2021-02-08 DIAGNOSIS — N52.9 ERECTILE DYSFUNCTION, UNSPECIFIED ERECTILE DYSFUNCTION TYPE: ICD-10-CM

## 2021-02-09 ENCOUNTER — MYC MEDICAL ADVICE (OUTPATIENT)
Dept: INTERNAL MEDICINE | Facility: OTHER | Age: 56
End: 2021-02-09

## 2021-02-09 NOTE — TELEPHONE ENCOUNTER
After verifying patient's name and date of birth, patient was given the below information.  Sindhu Brandt....2/9/2021 8:36 AM

## 2021-02-15 DIAGNOSIS — Z00.00 HEALTHCARE MAINTENANCE: Primary | ICD-10-CM

## 2021-02-15 NOTE — TELEPHONE ENCOUNTER
Screening Questions for the Scheduling of Screening Colonoscopies   (If Colonoscopy is diagnostic, Provider should review the chart before scheduling.)  Are you younger than 50 or older than 80?   NO   Do you take aspirin or fish oil?  YES - ASPIRIN   (if yes, tell patient to stop 1 week prior to Colonoscopy)  Do you take warfarin (Coumadin), clopidogrel (Plavix), apixaban (Eliquis), dabigatram (Pradaxa), rivaroxaban (Xarelto) or any blood thinner? NO   Do you use oxygen at home?  YES - CPAP  Do you have kidney disease? NO   Are you on dialysis? NO   Have you had a stroke or heart attack in the last year? NO   Have you had a stent in your heart or any blood vessel in the last year? NO   Have you had a transplant of any organ? NO   Have you had a colonoscopy or upper endoscopy (EGD) before? YES          When?  2010  Date of scheduled Colonoscopy. 03/29/2021  Provider LONA   [facility]

## 2021-02-16 RX ORDER — POLYETHYLENE GLYCOL 3350 17 G/17G
POWDER, FOR SOLUTION ORAL
Qty: 255 G | Refills: 0 | Status: ON HOLD | OUTPATIENT
Start: 2021-02-16 | End: 2021-03-29

## 2021-02-16 RX ORDER — BISACODYL 5 MG/1
TABLET, DELAYED RELEASE ORAL
Qty: 2 TABLET | Refills: 0 | Status: ON HOLD | OUTPATIENT
Start: 2021-02-16 | End: 2021-03-29

## 2021-02-18 ENCOUNTER — OFFICE VISIT (OUTPATIENT)
Dept: UROLOGY | Facility: OTHER | Age: 56
End: 2021-02-18
Attending: INTERNAL MEDICINE
Payer: COMMERCIAL

## 2021-02-18 VITALS
SYSTOLIC BLOOD PRESSURE: 130 MMHG | HEART RATE: 72 BPM | BODY MASS INDEX: 33.88 KG/M2 | WEIGHT: 249.8 LBS | DIASTOLIC BLOOD PRESSURE: 88 MMHG | RESPIRATION RATE: 16 BRPM

## 2021-02-18 DIAGNOSIS — R39.12 WEAK URINARY STREAM: Primary | ICD-10-CM

## 2021-02-18 DIAGNOSIS — N52.9 ERECTILE DYSFUNCTION, UNSPECIFIED ERECTILE DYSFUNCTION TYPE: ICD-10-CM

## 2021-02-18 LAB
ALBUMIN UR-MCNC: NEGATIVE MG/DL
APPEARANCE UR: CLEAR
BILIRUB UR QL STRIP: NEGATIVE
COLOR UR AUTO: NORMAL
GLUCOSE UR STRIP-MCNC: NEGATIVE MG/DL
HGB UR QL STRIP: NEGATIVE
KETONES UR STRIP-MCNC: NEGATIVE MG/DL
LEUKOCYTE ESTERASE UR QL STRIP: NEGATIVE
NITRATE UR QL: NEGATIVE
PH UR STRIP: 6 PH (ref 5–7)
SOURCE: NORMAL
SP GR UR STRIP: 1.02 (ref 1–1.03)
UROBILINOGEN UR STRIP-MCNC: NORMAL MG/DL (ref 0–2)

## 2021-02-18 PROCEDURE — 99204 OFFICE O/P NEW MOD 45 MIN: CPT | Mod: 25 | Performed by: UROLOGY

## 2021-02-18 PROCEDURE — 51798 US URINE CAPACITY MEASURE: CPT | Performed by: UROLOGY

## 2021-02-18 PROCEDURE — 81003 URINALYSIS AUTO W/O SCOPE: CPT | Mod: ZL | Performed by: UROLOGY

## 2021-02-18 RX ORDER — SILDENAFIL CITRATE 20 MG/1
TABLET ORAL
Qty: 30 TABLET | Refills: 11 | Status: SHIPPED | OUTPATIENT
Start: 2021-02-18 | End: 2022-10-11

## 2021-02-18 RX ORDER — CHOLECALCIFEROL (VITAMIN D3) 25 MCG
1 TABLET,CHEWABLE ORAL DAILY
COMMUNITY

## 2021-02-18 ASSESSMENT — PAIN SCALES - GENERAL: PAINLEVEL: NO PAIN (0)

## 2021-02-18 NOTE — PROGRESS NOTES
Type of Visit  NPV    Chief Complaint  BPH with weak stream  ED    HPI  Mr. Smyth is a 55 year old male who presents with complaints of weak stream and erectile dysfunction  Complains of progressively worsening urinary symptoms over the past 6 months.  He denies dysuria, hematuria, pelvic or flank pain.  No past urologic history.  He is taking Flomax chronically for urinary symptoms.  Denies side effects.  Primary complaint is severely diminished stream when voiding in the morning.  This resolves throughout the day.  Minimal caffeine intake.  Minimal nocturia.    Regarding erectile dysfunction this has progressively worsened over the past 1 to 2 years.  He has not tried any treatment in the past.  Rates his rigidity as 5-7/10.  Highly motivated for treatment.  Denies nitroglycerin products      Past Medical History  He  has a past medical history of Sleep apnea and Tear of left rotator cuff.  Patient Active Problem List   Diagnosis     Allergic rhinitis     Rosacea     Benign essential hypertension     Mixed hyperlipidemia     GANESH on CPAP - uses nightly, finds helpful and beneficial       Past Surgical History  He  has a past surgical history that includes Arthroscopy shoulder; Arthroscopy shoulder; and Colonoscopy (12/27/2010).    Medications  He has a current medication list which includes the following prescription(s): b-12, aspirin, bisacodyl, hydrochlorothiazide, omega-3, magnesium oxide, metoprolol succinate er, misc. devices, montelukast, multivitamin adults 50+, nabumetone, polyethylene glycol, rosuvastatin, and tamsulosin.    Allergies  Allergies   Allergen Reactions     Pollen Extract      Other reaction(s): Runny Nose       Social History  He  reports that he has never smoked. His smokeless tobacco use includes snuff. He reports current alcohol use. He reports that he does not use drugs.  No drug abuse.    Family History  Family History   Problem Relation Age of Onset     Other - See Comments Mother          Alzheimer's disease,  at age 75     Other - See Comments Father         BPH, cancer of the prostate in his seventies.     Hypertension Father         Hypertension,Sepsis after surgery. - 81 y/o     Other - See Comments Sister         No Known Problems     Other - See Comments Brother         No Known Problems     Heart Disease No family hx of         Heart Disease,No known premature CAD or DM.     Diabetes No family hx of         Diabetes,No known premature CAD or DM.       Review of Systems  I personally reviewed the ROS with the patient.    Nursing Notes:   Toyin Duncan RN  2021  9:44 AM  Signed  Review of Systems:    Weight loss:    No     Recent fever/chills:  No   Night sweats:   Yes  Current skin rash:  No   Recent hair loss:  No  Heat intolerance:  No   Cold intolerance:  No  Chest pain:   No   Palpitations:   No  Shortness of breath:  No   Wheezing:   No  Constipation:    No   Diarrhea:   No   Nausea:   No   Vomiting:   No   Kidney/side pain:  No   Back pain:   No  Frequent headaches:  No   Dizziness:     No  Leg swelling:   No   Calf pain:    No    Parents, brothers or sisters with history of kidney cancer:   No  Parents, brothers or sisters with history of bladder cancer: No    Toyin Duncan RN  2021  9:54 AM  Signed  Post-Void Residual  A post-void residual was measured by ultrasonic bladder scanner.  4 mL      Physical Exam  Vitals:    21 0949   BP: 130/88   BP Location: Left arm   Patient Position: Sitting   Cuff Size: Adult Large   Pulse: 72   Resp: 16   Weight: 113.3 kg (249 lb 12.8 oz)     Constitutional: No acute distress.  Alert and cooperative   Head: NCAT  Eyes: Conjunctivae normal  Cardiovascular: Regular rate.  Pulmonary/Chest: Respirations are even and non-labored bilaterally, no audible wheezing  Abdominal: Soft. No distension, tenderness, masses or guarding.   Neurological: A + O x 3.  Cranial Nerves II-XII grossly intact.  Extremities: WILSON x 4, Warm. No  clubbing.  No cyanosis.    Skin: Pink, warm and dry.  No visible rashes noted.  Psychiatric:  Normal mood and affect  Back:  No left CVA tenderness.  No right CVA tenderness.  Genitourinary:  Nonpalpable bladder    Labs  Results for orders placed or performed in visit on 02/18/21   UA reflex to Microscopic     Status: None   Result Value Ref Range    Color Urine Light Yellow     Appearance Urine Clear     Glucose Urine Negative NEG^Negative mg/dL    Bilirubin Urine Negative NEG^Negative    Ketones Urine Negative NEG^Negative mg/dL    Specific Gravity Urine 1.019 1.003 - 1.035    Blood Urine Negative NEG^Negative    pH Urine 6.0 5.0 - 7.0 pH    Protein Albumin Urine Negative NEG^Negative mg/dL    Urobilinogen mg/dL Normal 0.0 - 2.0 mg/dL    Nitrite Urine Negative NEG^Negative    Leukocyte Esterase Urine Negative NEG^Negative    Source Midstream Urine      Results for INDIA SMYTH (MRN [id number]) as of 2/18/2021 10:26   9/30/2020 08:41   PSA 2.162     Post-Void Residual  A post-void residual was measured by ultrasonic bladder scanner.  4 mL today    Assessment  Mr. Smyth is a 55 year old male who presents with weak stream and ED.  We discussed his 2 new problems: urinary symptoms and erectile dysfunction separately.  Regarding the urinary problems I recommended he continue Flomax.  I also explained the pathophysiology of diminished stream and an over distended bladder.    Also we discussed ED.  Discussed treatment options of mechanical assistance, oral PDE 5 inhibitors, intracavernosal injections.  The patient would like to try Viagra.    He was informed of the most common side effects of Viagra such as headache, flushing, nausea and visual changes (such as blurred vision).    Plan  Continue Flomax  Start sildenafil 40-100mg 1 hour prior to activity  Follow up prn or 1 year if refills are needed

## 2021-02-18 NOTE — NURSING NOTE
Review of Systems:    Weight loss:    No     Recent fever/chills:  No   Night sweats:   Yes  Current skin rash:  No   Recent hair loss:  No  Heat intolerance:  No   Cold intolerance:  No  Chest pain:   No   Palpitations:   No  Shortness of breath:  No   Wheezing:   No  Constipation:    No   Diarrhea:   No   Nausea:   No   Vomiting:   No   Kidney/side pain:  No   Back pain:   No  Frequent headaches:  No   Dizziness:     No  Leg swelling:   No   Calf pain:    No    Parents, brothers or sisters with history of kidney cancer:   No  Parents, brothers or sisters with history of bladder cancer: No

## 2021-02-22 ENCOUNTER — MYC MEDICAL ADVICE (OUTPATIENT)
Dept: UROLOGY | Facility: OTHER | Age: 56
End: 2021-02-22

## 2021-03-23 PROBLEM — Z00.00 HEALTHCARE MAINTENANCE: Status: ACTIVE | Noted: 2021-03-23

## 2021-03-25 ENCOUNTER — ALLIED HEALTH/NURSE VISIT (OUTPATIENT)
Dept: FAMILY MEDICINE | Facility: OTHER | Age: 56
End: 2021-03-25
Attending: INTERNAL MEDICINE
Payer: COMMERCIAL

## 2021-03-25 DIAGNOSIS — Z00.00 HEALTHCARE MAINTENANCE: ICD-10-CM

## 2021-03-25 LAB
SARS-COV-2 RNA RESP QL NAA+PROBE: NORMAL
SPECIMEN SOURCE: NORMAL

## 2021-03-25 PROCEDURE — U0005 INFEC AGEN DETEC AMPLI PROBE: HCPCS | Mod: ZL | Performed by: SURGERY

## 2021-03-25 PROCEDURE — U0003 INFECTIOUS AGENT DETECTION BY NUCLEIC ACID (DNA OR RNA); SEVERE ACUTE RESPIRATORY SYNDROME CORONAVIRUS 2 (SARS-COV-2) (CORONAVIRUS DISEASE [COVID-19]), AMPLIFIED PROBE TECHNIQUE, MAKING USE OF HIGH THROUGHPUT TECHNOLOGIES AS DESCRIBED BY CMS-2020-01-R: HCPCS | Mod: ZL | Performed by: SURGERY

## 2021-03-25 PROCEDURE — C9803 HOPD COVID-19 SPEC COLLECT: HCPCS

## 2021-03-26 LAB
LABORATORY COMMENT REPORT: NORMAL
SARS-COV-2 RNA RESP QL NAA+PROBE: NEGATIVE
SPECIMEN SOURCE: NORMAL

## 2021-03-29 ENCOUNTER — ANESTHESIA EVENT (OUTPATIENT)
Dept: SURGERY | Facility: OTHER | Age: 56
End: 2021-03-29
Payer: COMMERCIAL

## 2021-03-29 ENCOUNTER — ANESTHESIA (OUTPATIENT)
Dept: SURGERY | Facility: OTHER | Age: 56
End: 2021-03-29
Payer: COMMERCIAL

## 2021-03-29 ENCOUNTER — HOSPITAL ENCOUNTER (OUTPATIENT)
Facility: OTHER | Age: 56
Discharge: HOME | End: 2021-03-29
Attending: SURGERY | Admitting: SURGERY
Payer: COMMERCIAL

## 2021-03-29 VITALS
TEMPERATURE: 97.3 F | HEART RATE: 66 BPM | OXYGEN SATURATION: 97 % | WEIGHT: 243 LBS | BODY MASS INDEX: 32.96 KG/M2 | SYSTOLIC BLOOD PRESSURE: 153 MMHG | DIASTOLIC BLOOD PRESSURE: 85 MMHG | RESPIRATION RATE: 16 BRPM

## 2021-03-29 PROBLEM — K63.5 COLON POLYPS: Status: ACTIVE | Noted: 2021-03-29

## 2021-03-29 PROCEDURE — 45380 COLONOSCOPY AND BIOPSY: CPT | Performed by: SURGERY

## 2021-03-29 PROCEDURE — 250N000013 HC RX MED GY IP 250 OP 250 PS 637: Performed by: SURGERY

## 2021-03-29 PROCEDURE — 258N000003 HC RX IP 258 OP 636: Performed by: SURGERY

## 2021-03-29 PROCEDURE — 250N000009 HC RX 250: Performed by: SURGERY

## 2021-03-29 PROCEDURE — 45384 COLONOSCOPY W/LESION REMOVAL: CPT | Performed by: SURGERY

## 2021-03-29 PROCEDURE — 258N000003 HC RX IP 258 OP 636: Performed by: NURSE ANESTHETIST, CERTIFIED REGISTERED

## 2021-03-29 PROCEDURE — 88305 TISSUE EXAM BY PATHOLOGIST: CPT

## 2021-03-29 PROCEDURE — 45384 COLONOSCOPY W/LESION REMOVAL: CPT | Performed by: NURSE ANESTHETIST, CERTIFIED REGISTERED

## 2021-03-29 PROCEDURE — 250N000009 HC RX 250: Performed by: NURSE ANESTHETIST, CERTIFIED REGISTERED

## 2021-03-29 PROCEDURE — 250N000011 HC RX IP 250 OP 636: Performed by: NURSE ANESTHETIST, CERTIFIED REGISTERED

## 2021-03-29 PROCEDURE — 45384 COLONOSCOPY W/LESION REMOVAL: CPT | Mod: PT | Performed by: SURGERY

## 2021-03-29 RX ORDER — SIMETHICONE 40MG/0.6ML
SUSPENSION, DROPS(FINAL DOSAGE FORM)(ML) ORAL PRN
Status: DISCONTINUED | OUTPATIENT
Start: 2021-03-29 | End: 2021-03-29 | Stop reason: HOSPADM

## 2021-03-29 RX ORDER — PROPOFOL 10 MG/ML
INJECTION, EMULSION INTRAVENOUS PRN
Status: DISCONTINUED | OUTPATIENT
Start: 2021-03-29 | End: 2021-03-29

## 2021-03-29 RX ORDER — LIDOCAINE HYDROCHLORIDE 20 MG/ML
INJECTION, SOLUTION INFILTRATION; PERINEURAL PRN
Status: DISCONTINUED | OUTPATIENT
Start: 2021-03-29 | End: 2021-03-29

## 2021-03-29 RX ORDER — SODIUM CHLORIDE 9 MG/ML
INJECTION, SOLUTION INTRAVENOUS CONTINUOUS
Status: DISCONTINUED | OUTPATIENT
Start: 2021-03-29 | End: 2021-03-29 | Stop reason: HOSPADM

## 2021-03-29 RX ORDER — SODIUM CHLORIDE, SODIUM LACTATE, POTASSIUM CHLORIDE, CALCIUM CHLORIDE 600; 310; 30; 20 MG/100ML; MG/100ML; MG/100ML; MG/100ML
INJECTION, SOLUTION INTRAVENOUS CONTINUOUS
Status: DISCONTINUED | OUTPATIENT
Start: 2021-03-29 | End: 2021-03-29 | Stop reason: HOSPADM

## 2021-03-29 RX ORDER — NALOXONE HYDROCHLORIDE 0.4 MG/ML
0.4 INJECTION, SOLUTION INTRAMUSCULAR; INTRAVENOUS; SUBCUTANEOUS
Status: DISCONTINUED | OUTPATIENT
Start: 2021-03-29 | End: 2021-03-29 | Stop reason: HOSPADM

## 2021-03-29 RX ORDER — PROPOFOL 10 MG/ML
INJECTION, EMULSION INTRAVENOUS CONTINUOUS PRN
Status: DISCONTINUED | OUTPATIENT
Start: 2021-03-29 | End: 2021-03-29

## 2021-03-29 RX ORDER — ONDANSETRON 2 MG/ML
4 INJECTION INTRAMUSCULAR; INTRAVENOUS
Status: DISCONTINUED | OUTPATIENT
Start: 2021-03-29 | End: 2021-03-29 | Stop reason: HOSPADM

## 2021-03-29 RX ORDER — FLUMAZENIL 0.1 MG/ML
0.2 INJECTION, SOLUTION INTRAVENOUS
Status: DISCONTINUED | OUTPATIENT
Start: 2021-03-29 | End: 2021-03-29 | Stop reason: HOSPADM

## 2021-03-29 RX ORDER — NALOXONE HYDROCHLORIDE 0.4 MG/ML
0.2 INJECTION, SOLUTION INTRAMUSCULAR; INTRAVENOUS; SUBCUTANEOUS
Status: DISCONTINUED | OUTPATIENT
Start: 2021-03-29 | End: 2021-03-29 | Stop reason: HOSPADM

## 2021-03-29 RX ORDER — LIDOCAINE 40 MG/G
CREAM TOPICAL
Status: DISCONTINUED | OUTPATIENT
Start: 2021-03-29 | End: 2021-03-29 | Stop reason: HOSPADM

## 2021-03-29 RX ADMIN — PROPOFOL 150 MCG/KG/MIN: 10 INJECTION, EMULSION INTRAVENOUS at 08:23

## 2021-03-29 RX ADMIN — SODIUM CHLORIDE: 0.9 INJECTION, SOLUTION INTRAVENOUS at 08:20

## 2021-03-29 RX ADMIN — PROPOFOL 30 MG: 10 INJECTION, EMULSION INTRAVENOUS at 08:23

## 2021-03-29 RX ADMIN — PROPOFOL 20 MG: 10 INJECTION, EMULSION INTRAVENOUS at 08:27

## 2021-03-29 RX ADMIN — PROPOFOL 20 MG: 10 INJECTION, EMULSION INTRAVENOUS at 08:40

## 2021-03-29 RX ADMIN — PROPOFOL 20 MG: 10 INJECTION, EMULSION INTRAVENOUS at 08:25

## 2021-03-29 RX ADMIN — LIDOCAINE HYDROCHLORIDE 40 MG: 20 INJECTION, SOLUTION INFILTRATION; PERINEURAL at 08:22

## 2021-03-29 ASSESSMENT — LIFESTYLE VARIABLES: TOBACCO_USE: 1

## 2021-03-29 NOTE — DISCHARGE INSTRUCTIONS
[facility]  Adult Discharge Orders & Instructions    ________________________________________________________________          For 12 hours after surgery  1. Get plenty of rest.  A responsible adult must stay with you for at least 12 hours after you leave the hospital.   2. You may feel lightheaded.  IF so, sit for a few minutes before standing.  Have someone help you get up.   3. You may have a slight fever. Call the doctor if your fever is over 101 F (38.3 C) (taken under the tongue) or lasts longer than 24 hours.  4. You may have a dry mouth, a sore throat, muscle aches or trouble sleeping.  These should go away after 24 hours.  5. Do not make important or legal decisions.  6.   Do not drive or use heavy equipment.  If you have weakness or tingling, don't drive or use heavy equipment until this feeling goes away.    To contact a doctor, call   [phone]_______________________

## 2021-03-29 NOTE — H&P
History and Physical    CHIEF COMPLAINT / REASON FOR PROCEDURE:  Healthcare maintenance     PERTINENT HISTORY   Patient is a 56 year old male who presents today for colonoscopy for Healthcare maintenance .   Last colonoscopy 2010.  Patient has no complaints.    Past Medical History:   Diagnosis Date     Sleep apnea     No Comments Provided     Tear of left rotator cuff     No Comments Provided     Past Surgical History:   Procedure Laterality Date     ARTHROSCOPY SHOULDER      7/1/04,Right shoulder surgery, due to biceps tendon tear, Dr. Burden     ARTHROSCOPY SHOULDER      9/2010,Left shoulder surgery- [facility].     COLONOSCOPY  12/27/2010    normal; follow up 10 years       Bleeding tendencies:  No    ALLERGIES/SENSITIVITIES:   Allergies   Allergen Reactions     Pollen Extract      Other reaction(s): Runny Nose        CURRENT MEDICATIONS:    Prior to Admission medications    Medication Sig Start Date End Date Taking? Authorizing Provider   hydrochlorothiazide (HYDRODIURIL) 25 MG tablet Take 1 tablet (25 mg) by mouth daily 9/30/20  Yes Luis Fay MD   metoprolol succinate ER (TOPROL-XL) 100 MG 24 hr tablet Take 1 tablet (100 mg) by mouth daily 9/30/20  Yes Luis Fay MD   Misc. Devices KIT CPAP tubing. [employer] pro c-flex 8/20/13  Yes Reported, Patient   nabumetone (RELAFEN) 750 MG tablet Take 1 tablet (750 mg) by mouth 2 times daily - for pain, take with food 9/30/20  Yes Luis Fay MD   rosuvastatin (CRESTOR) 10 MG tablet Take 1 tablet (10 mg) by mouth daily -- for cholesterol, heart attack risk reduction 9/30/20  Yes Luis Fay MD   sildenafil (REVATIO) 20 MG tablet Take 3-5 (60-100mg) tablets by mouth 1 hour prior to sexual activity 2/18/21  Yes Tawanda Berrios MD   tamsulosin (FLOMAX) 0.4 MG capsule Take 1 capsule (0.4 mg) by mouth daily 9/30/20  Yes Luis Fay MD   aspirin EC 81 MG EC tablet Take 81 mg by mouth 1/18/13   Reported, Patient   Cyanocobalamin (B-12) 2500  MCG TABS Take 1 tablet by mouth daily    Reported, Patient   Krill Oil (OMEGA-3) 500 MG CAPS Take 1 tablet by mouth daily 7/1/19   Reported, Patient   magnesium oxide (MAG-OX) 400 (240 Mg) MG tablet Take 1 tablet by mouth daily 7/1/19   Reported, Patient   montelukast (SINGULAIR) 10 MG tablet Take 1 tablet (10 mg) by mouth At Bedtime - for allergies 9/30/20   Luis Fay MD   Multiple Vitamins-Minerals (MULTIVITAMIN ADULTS 50+) TABS Take 1 tablet by mouth daily 7/1/19   Reported, Patient       Physical Exam:  BP (!) 150/83   Temp 97.3  F (36.3  C) (Tympanic)   Resp 16   Wt 110.2 kg (243 lb)   SpO2 96%   BMI 32.96 kg/m    EXAM:  Chest/Respiratory Exam: Normal - Clear to auscultation without rales, rhonchi, or wheezing.  Cardiovascular Exam: normal, regular rate and rhythm        PLAN: COLONOSCOPY .  Patient understands risks of bleeding, perforation, potential inability to reach cecum, aspiration and wishes to proceed. MAC needed for GANESH, ASA III.

## 2021-03-29 NOTE — OR NURSING
The patient was unable or refused to remove jewelry prior to their surgical procedure. The patient has been instructed on the risk of injury and possible infection. In the event jewelry or piercings are obstructing the surgical process or impeding circulation, the jewelry or piercings may need to be cut off. The surgeon and anesthesia provider have been notified that an item cannot be removed, or that the patient refuses to remove the jewelry or piercing. The surgeon and anesthesia provider will determine if it is in the patient's best interest to proceed with the procedure with the jewelry or piercings remaining in contact with the patient's body.   Wedding ring L 4th finger

## 2021-03-29 NOTE — OP NOTE
PROCEDURE NOTE    DATE OF SERVICE: 3/29/2021    SURGEON: Floyd Lemus MD    PRE-OP DIAGNOSIS:    Healthcare maintenance       POST-OP DIAGNOSIS:  Same  Polyps at 15 cm and rectum    PROCEDURE:   Colonoscopy with hot biopsy    ANESTHESIA:  DANDRE Brown CRNA    INDICATION FOR THE PROCEDURE: The patient is a 56 year old male in need of Healthcare maintenance  . The patient has no other complaints  . After explaining the risks to include bleeding, perforation, potential inability toreach the cecum, the patient wished to proceed.    PROCEDURE:After adequate sedation, the patient was in the left lateral decubitus position.  Rectal exam was performed.  There was normal tone and no palpable masses .  The colonoscope was introduced into the rectum and advanced to the cecum with Mild difficulty.  The patient's prep was fair.  The terminal cecum was reached.  The cecum, ascending, transverse, descending and sigmoid colon was with diminutive polyps at 15 cm and rectum that were  Hot biopsied and destroyed .  The scope was retroflexed in the rectum.  The rectum was otherwise unremarkable  .  The scope was straightened and removed.  The patient tolerated the procedure well.     ESTIMATED BLOOD LOSS: none    COMPLICATIONS:  None    TISSUE REMOVED:  Yes    RECOMMEND:      Follow-up pending pathology      Floyd Lemus MD FACS

## 2021-03-29 NOTE — ANESTHESIA PREPROCEDURE EVALUATION
Anesthesia Pre-Procedure Evaluation    Patient: Deandre Smyth   MRN: [id number] : 1965        Preoperative Diagnosis: Screening for colon cancer [Z12.11]   Procedure : Procedure(s):  COLONOSCOPY     Past Medical History:   Diagnosis Date     Sleep apnea     No Comments Provided     Tear of left rotator cuff     No Comments Provided      Past Surgical History:   Procedure Laterality Date     ARTHROSCOPY SHOULDER      04,Right shoulder surgery, due to biceps tendon tear, Dr. Burden     ARTHROSCOPY SHOULDER      2010,Left shoulder surgery- [facility].     COLONOSCOPY  2010    normal; follow up 10 years      Allergies   Allergen Reactions     Pollen Extract      Other reaction(s): Runny Nose      Social History     Tobacco Use     Smoking status: Never Smoker     Smokeless tobacco: Current User     Types: Snuff   Substance Use Topics     Alcohol use: Yes     Comment: 6 pack of beer per week on average      Wt Readings from Last 1 Encounters:   21 113.3 kg (249 lb 12.8 oz)        Anesthesia Evaluation            ROS/MED HX  ENT/Pulmonary: Comment: Chews tobacco    (+) sleep apnea, moderate, uses CPAP, allergic rhinitis, tobacco use, Current use,     Neurologic:  - neg neurologic ROS     Cardiovascular:     (+) Dyslipidemia hypertension-----    METS/Exercise Tolerance: >4 METS    Hematologic:  - neg hematologic  ROS     Musculoskeletal:  - neg musculoskeletal ROS     GI/Hepatic:     (+) bowel prep,     Renal/Genitourinary:  - neg Renal ROS     Endo:     (+) Obesity,     Psychiatric/Substance Use:  - neg psychiatric ROS     Infectious Disease:  - neg infectious disease ROS     Malignancy:  - neg malignancy ROS     Other:  - neg other ROS          Physical Exam    Airway        Mallampati: II   TM distance: > 3 FB   Neck ROM: full   Mouth opening: > 3 cm    Respiratory Devices and Support         Dental  no notable dental history         Cardiovascular   cardiovascular exam normal       Rhythm and  rate: regular and normal     Pulmonary   pulmonary exam normal        breath sounds clear to auscultation           OUTSIDE LABS:  CBC:   Lab Results   Component Value Date    WBC 3.1 (L) 09/30/2020    WBC 4.1 12/20/2019    HGB 13.8 09/30/2020    HGB 14.5 12/20/2019    HCT 41.5 09/30/2020    HCT 43.6 12/20/2019     09/30/2020     12/20/2019     BMP:   Lab Results   Component Value Date     09/30/2020     12/20/2019    POTASSIUM 4.2 09/30/2020    POTASSIUM 4.6 12/20/2019    CHLORIDE 101 09/30/2020    CHLORIDE 106 12/20/2019    CO2 31 09/30/2020    CO2 29 12/20/2019    BUN 20 09/30/2020    BUN 19 12/20/2019    CR 1.01 09/30/2020    CR 1.07 12/20/2019     09/30/2020    GLC 97 12/20/2019     COAGS: No results found for: PTT, INR, FIBR  POC: No results found for: BGM, HCG, HCGS  HEPATIC:   Lab Results   Component Value Date    ALBUMIN 4.7 09/30/2020    PROTTOTAL 7.5 09/30/2020    ALT 33 09/30/2020    AST 36 09/30/2020    ALKPHOS 60 09/30/2020    BILITOTAL 0.8 09/30/2020     OTHER:   Lab Results   Component Value Date    KADE 9.7 09/30/2020       Anesthesia Plan    ASA Status:  3   NPO Status:  NPO Appropriate    Anesthesia Type: MAC.     - Reason for MAC: straight local not clinically adequate (GANESH on CPAP, )              Consents    Anesthesia Plan(s) and associated risks, benefits, and realistic alternatives discussed. Questions answered and patient/representative(s) expressed understanding.     - Discussed with:  Patient      - Extended Intubation/Ventilatory Support Discussed: No.      - Patient is DNR/DNI Status: No    Use of blood products discussed: No .     Postoperative Care            Comments:                DIO Francisco CRNA

## 2021-03-29 NOTE — ANESTHESIA POSTPROCEDURE EVALUATION
Patient: Deandre Smyth    Procedure(s):  COLONOSCOPY, WITH POLYPECTOMY AND BIOPSY    Diagnosis:Screening for colon cancer [Z12.11]  Diagnosis Additional Information: No value filed.    Anesthesia Type:  MAC    Note:  Disposition: Outpatient   Postop Pain Control: Uneventful            Sign Out: Well controlled pain   PONV: No   Neuro/Psych: Uneventful            Sign Out: Acceptable/Baseline neuro status   Airway/Respiratory: Uneventful            Sign Out: Acceptable/Baseline resp. status   CV/Hemodynamics: Uneventful            Sign Out: Acceptable CV status   Other NRE: NONE   DID A NON-ROUTINE EVENT OCCUR?          Last vitals:  Vitals:    03/29/21 0739 03/29/21 0849   BP: (!) 150/83 137/77   Pulse:  60   Resp: 16 14   Temp: 97.3  F (36.3  C)    SpO2: 96% 97%       Last vitals prior to Anesthesia Care Transfer:  CRNA VITALS  3/29/2021 0816 - 3/29/2021 0916      3/29/2021             Resp Rate (set):  10          Electronically Signed By: DIO Estevez CRNA  March 29, 2021  9:36 AM  
maximum assist (25% patients effort)

## 2021-03-31 PROBLEM — K63.5 COLON POLYPS: Status: RESOLVED | Noted: 2021-03-29 | Resolved: 2021-03-31

## 2021-05-06 ENCOUNTER — HOSPITAL ENCOUNTER (EMERGENCY)
Facility: OTHER | Age: 56
Discharge: HOME | End: 2021-05-06
Attending: PHYSICIAN ASSISTANT | Admitting: PHYSICIAN ASSISTANT
Payer: OTHER MISCELLANEOUS

## 2021-05-06 ENCOUNTER — TRANSFERRED RECORDS (OUTPATIENT)
Dept: HEALTH INFORMATION MANAGEMENT | Facility: OTHER | Age: 56
End: 2021-05-06

## 2021-05-06 ENCOUNTER — APPOINTMENT (OUTPATIENT)
Dept: CT IMAGING | Facility: OTHER | Age: 56
End: 2021-05-06
Attending: PHYSICIAN ASSISTANT
Payer: OTHER MISCELLANEOUS

## 2021-05-06 VITALS
SYSTOLIC BLOOD PRESSURE: 173 MMHG | DIASTOLIC BLOOD PRESSURE: 92 MMHG | TEMPERATURE: 98.9 F | HEIGHT: 72 IN | BODY MASS INDEX: 31.15 KG/M2 | WEIGHT: 230 LBS | OXYGEN SATURATION: 98 % | RESPIRATION RATE: 16 BRPM | HEART RATE: 95 BPM

## 2021-05-06 DIAGNOSIS — S05.92XA LEFT EYE INJURY: ICD-10-CM

## 2021-05-06 PROCEDURE — 250N000009 HC RX 250: Performed by: PHYSICIAN ASSISTANT

## 2021-05-06 PROCEDURE — 99284 EMERGENCY DEPT VISIT MOD MDM: CPT | Performed by: PHYSICIAN ASSISTANT

## 2021-05-06 PROCEDURE — 70480 CT ORBIT/EAR/FOSSA W/O DYE: CPT

## 2021-05-06 PROCEDURE — 99284 EMERGENCY DEPT VISIT MOD MDM: CPT | Mod: 25 | Performed by: PHYSICIAN ASSISTANT

## 2021-05-06 RX ORDER — TIMOLOL MALEATE 5 MG/ML
1 SOLUTION/ DROPS OPHTHALMIC ONCE
Status: DISCONTINUED | OUTPATIENT
Start: 2021-05-06 | End: 2021-05-06 | Stop reason: HOSPADM

## 2021-05-06 RX ORDER — TETRACAINE HYDROCHLORIDE 5 MG/ML
1-2 SOLUTION OPHTHALMIC ONCE
Status: COMPLETED | OUTPATIENT
Start: 2021-05-06 | End: 2021-05-06

## 2021-05-06 RX ADMIN — FLUORESCEIN SODIUM 1 STRIP: 1 STRIP OPHTHALMIC at 16:33

## 2021-05-06 RX ADMIN — TETRACAINE HYDROCHLORIDE 2 DROP: 5 SOLUTION OPHTHALMIC at 16:33

## 2021-05-06 ASSESSMENT — VISUAL ACUITY
OD: 20/20
OS: 20/20
OD: 20/20
OS: 20/20

## 2021-05-06 ASSESSMENT — MIFFLIN-ST. JEOR: SCORE: 1901.27

## 2021-05-06 NOTE — ED TRIAGE NOTES
"ED Nursing Triage Note (General)   ________________________________    Deandre KARTHIK Smyth is a 56 year old Male that presents to triage private car  With history of  Possible metal in eye.  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  Eye is red, no vision changes. reported by patient   Significant symptoms had onset 3 hour(s) ago.    Patient appears alert , in no acute distress., and cooperative behavior.    GCS Total = 15  Airway: intact  Breathing noted as Normal.  Circulation Normal  Skin normal  Action taken:  Triage to critical care immediately      PRE HOSPITAL PRIOR LIVING SITUATION Spouse  "

## 2021-05-06 NOTE — DISCHARGE INSTRUCTIONS
Go directly to the [facility] emergency room in [place].  Dr. Lubin, ophthalmologist, will see you there for reassessment.

## 2021-05-07 ASSESSMENT — ENCOUNTER SYMPTOMS
ADENOPATHY: 0
CONFUSION: 0
WOUND: 0
CHEST TIGHTNESS: 0
ABDOMINAL PAIN: 0
EYE REDNESS: 1
FEVER: 0
SHORTNESS OF BREATH: 0
BRUISES/BLEEDS EASILY: 0
CHILLS: 0
BACK PAIN: 0
HEMATURIA: 0

## 2021-05-07 NOTE — ED PROVIDER NOTES
"  History     Chief Complaint   Patient presents with     Eye Problem     HPI  Deandre Smyth is a 56 year old male who presents to the ED for evaluation of an eye problem. He is a  and today while at work he felt \"something\" to his left eye. He has not really had any pain with this. No changes in vision. A coworker told him his eye was red and that is when he decided to get checked out. He did have wrap around safety glasses on.     Allergies:  Allergies   Allergen Reactions     Pollen Extract      Other reaction(s): Runny Nose       Problem List:    Patient Active Problem List    Diagnosis Date Noted     Healthcare maintenance 2021     Priority: Medium     GANESH on CPAP - uses nightly, finds helpful and beneficial 2018     Priority: Medium     Mixed hyperlipidemia 2015     Priority: Medium     Allergic rhinitis 2011     Priority: Medium     Benign essential hypertension 2010     Priority: Medium        Past Medical History:    Past Medical History:   Diagnosis Date     Sleep apnea      Tear of left rotator cuff        Past Surgical History:    Past Surgical History:   Procedure Laterality Date     ARTHROSCOPY SHOULDER      04,Right shoulder surgery, due to biceps tendon tear, Dr. Burden     ARTHROSCOPY SHOULDER      2010,Left shoulder surgery- [facility].     COLONOSCOPY  2010    normal; follow up 10 years     COLONOSCOPY N/A 2021    F/U  hyperplastic       Family History:    Family History   Problem Relation Age of Onset     Other - See Comments Mother         Alzheimer's disease,  at age 75     Other - See Comments Father         BPH, cancer of the prostate in his seventies.     Hypertension Father         Hypertension,Sepsis after surgery. - 79 y/o     Other - See Comments Sister         No Known Problems     Other - See Comments Brother         No Known Problems     Heart Disease No family hx of         Heart Disease,No known premature CAD or DM. "     Diabetes No family hx of         Diabetes,No known premature CAD or DM.       Social History:  Marital Status:   [2]  Social History     Tobacco Use     Smoking status: Never Smoker     Smokeless tobacco: Current User     Types: Snuff   Substance Use Topics     Alcohol use: Yes     Comment: 6 pack of beer per week on average     Drug use: Never        Medications:    aspirin EC 81 MG EC tablet  Cyanocobalamin (B-12) 2500 MCG TABS  hydrochlorothiazide (HYDRODIURIL) 25 MG tablet  Krill Oil (OMEGA-3) 500 MG CAPS  magnesium oxide (MAG-OX) 400 (240 Mg) MG tablet  metoprolol succinate ER (TOPROL-XL) 100 MG 24 hr tablet  Misc. Devices KIT  montelukast (SINGULAIR) 10 MG tablet  Multiple Vitamins-Minerals (MULTIVITAMIN ADULTS 50+) TABS  nabumetone (RELAFEN) 750 MG tablet  rosuvastatin (CRESTOR) 10 MG tablet  sildenafil (REVATIO) 20 MG tablet  tamsulosin (FLOMAX) 0.4 MG capsule          Review of Systems   Constitutional: Negative for chills and fever.   HENT: Negative for congestion.    Eyes: Positive for redness. Negative for visual disturbance.   Respiratory: Negative for chest tightness and shortness of breath.    Cardiovascular: Negative for chest pain.   Gastrointestinal: Negative for abdominal pain.   Genitourinary: Negative for hematuria.   Musculoskeletal: Negative for back pain.   Skin: Negative for rash and wound.   Neurological: Negative for syncope.   Hematological: Negative for adenopathy. Does not bruise/bleed easily.   Psychiatric/Behavioral: Negative for confusion.       Physical Exam   BP: (!) 173/92  Pulse: 95  Temp: 98.9  F (37.2  C)  Resp: 16  Height: 182.9 cm (6')  Weight: 104.3 kg (230 lb)  SpO2: 98 %      Physical Exam  Constitutional:       General: He is not in acute distress.     Appearance: He is well-developed. He is not diaphoretic.   HENT:      Head: Normocephalic and atraumatic.   Eyes:      General: No scleral icterus.     Extraocular Movements: Extraocular movements intact.       "Conjunctiva/sclera: Conjunctivae normal.      Pupils: Pupils are equal, round, and reactive to light.      Comments: Subconjunctival hemorrhage to left half/sclera of left eye   Neck:      Musculoskeletal: Neck supple.   Cardiovascular:      Rate and Rhythm: Normal rate and regular rhythm.   Pulmonary:      Effort: Pulmonary effort is normal.      Breath sounds: Normal breath sounds.   Abdominal:      Palpations: Abdomen is soft.      Tenderness: There is no abdominal tenderness.   Musculoskeletal:         General: No deformity.   Lymphadenopathy:      Cervical: No cervical adenopathy.   Skin:     General: Skin is warm and dry.      Findings: No rash.   Neurological:      Mental Status: He is alert and oriented to person, place, and time. Mental status is at baseline.   Psychiatric:         Mood and Affect: Mood normal.         Behavior: Behavior normal.         ED Course        Procedures               Critical Care time:  none               Results for orders placed or performed during the hospital encounter of 05/06/21 (from the past 24 hour(s))   CT Orbits wo Contrast    Narrative    CT ORBITAL WO CONTRAST    HISTORY: 56 years Male With history of  Possible metal in eye.  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  Eye  is red, no vision changes. reported by patient   Significant symptoms had onset 3 hour(s) ago.    COMPARISON: Plain films 3/30/2018    TECHNIQUE: Axial CT imaging of the orbits was performed. Coronal and  sagittal reconstructions were obtained.    FINDINGS: The orbits are unremarkable. No metallic body is evident.    No abnormal fluid collection is seen. There is no evidence of  fracture.      Impression    IMPRESSION: Negative study.    ISRAEL SERRANO MD       Medications   tetracaine (PONTOCAINE) 0.5 % ophthalmic solution 1-2 drop (2 drops Both Eyes Given 5/6/21 1633)   fluorescein (FUL-SHAR) ophthalmic strip 1 strip (1 strip Both Eyes Given 5/6/21 1633) " "      Assessments & Plan (with Medical Decision Making)   Pt nontoxic in NAD. Heart, lung, bowel sounds normal, abd soft, nontender to palpation, nondistended. VSS, afebrile.     He does have Subconjunctival hemorrhage to left half/sclera of left eye. PERRL. Full ROM of eyes. I did look with slit lamp. No obvious FB seen in eye but with excessive swelling/redness of left portion of eye it is difficult to say if something could be in there. Fluorescein dye was used as well. No Kelsey sign.     I am concerned for possible globe rupture/penetration.     CT obtained which is read as:  -The orbits are unremarkable. No metallic body is evident.     -No abnormal fluid collection is seen. There is no evidence of  Fracture.    I discussed the case with Dr. Lubin, ophthalmology at [facility] in [place]. I measured the IOP for him which was \"17\" in the right eye and \"43\" in the left eye. I checked this twice.     Because of these higher pressures Dr. Lubin recommends giving timolol and that the patient go to [facility] ED. The patient wants to go by POV.     He is instructed to go directly there. His wife will be driving him.     Strict return precautions are given to the pt, they will return if symptoms are worsening or concerning. The pt understands and agrees with the plan and they are discharged.     Aristeo Womack PA-C    I have reviewed the nursing notes.    I have reviewed the findings, diagnosis, plan and need for follow up with the patient.       Discharge Medication List as of 5/6/2021  6:27 PM          Final diagnoses:   Left eye injury       5/6/2021   [facility] AND [facility]     Aristeo Womack PA  05/07/21 1118    "

## 2021-09-29 ENCOUNTER — MEDICAL CORRESPONDENCE (OUTPATIENT)
Dept: HEALTH INFORMATION MANAGEMENT | Facility: OTHER | Age: 56
End: 2021-09-29

## 2021-09-30 ENCOUNTER — TELEPHONE (OUTPATIENT)
Dept: INTERNAL MEDICINE | Facility: OTHER | Age: 56
End: 2021-09-30

## 2021-09-30 ENCOUNTER — OFFICE VISIT (OUTPATIENT)
Dept: INTERNAL MEDICINE | Facility: OTHER | Age: 56
End: 2021-09-30
Attending: INTERNAL MEDICINE
Payer: COMMERCIAL

## 2021-09-30 VITALS
HEART RATE: 58 BPM | WEIGHT: 258.8 LBS | BODY MASS INDEX: 35.05 KG/M2 | DIASTOLIC BLOOD PRESSURE: 70 MMHG | HEIGHT: 72 IN | RESPIRATION RATE: 16 BRPM | TEMPERATURE: 97 F | SYSTOLIC BLOOD PRESSURE: 138 MMHG | OXYGEN SATURATION: 98 %

## 2021-09-30 DIAGNOSIS — Z00.00 ANNUAL PHYSICAL EXAM: Primary | ICD-10-CM

## 2021-09-30 DIAGNOSIS — Z12.5 ENCOUNTER FOR SCREENING FOR MALIGNANT NEOPLASM OF PROSTATE: ICD-10-CM

## 2021-09-30 DIAGNOSIS — R35.0 BENIGN PROSTATIC HYPERPLASIA WITH URINARY FREQUENCY: ICD-10-CM

## 2021-09-30 DIAGNOSIS — Z23 NEED FOR PROPHYLACTIC VACCINATION AND INOCULATION AGAINST INFLUENZA: ICD-10-CM

## 2021-09-30 DIAGNOSIS — M72.2 PLANTAR FASCIITIS: ICD-10-CM

## 2021-09-30 DIAGNOSIS — I10 BENIGN ESSENTIAL HYPERTENSION: ICD-10-CM

## 2021-09-30 DIAGNOSIS — G47.33 OSA ON CPAP: ICD-10-CM

## 2021-09-30 DIAGNOSIS — N40.1 BENIGN PROSTATIC HYPERPLASIA WITH URINARY FREQUENCY: ICD-10-CM

## 2021-09-30 DIAGNOSIS — E66.01 MORBID OBESITY (H): ICD-10-CM

## 2021-09-30 DIAGNOSIS — E78.2 MIXED HYPERLIPIDEMIA: ICD-10-CM

## 2021-09-30 DIAGNOSIS — J30.9 ALLERGIC RHINITIS, UNSPECIFIED SEASONALITY, UNSPECIFIED TRIGGER: ICD-10-CM

## 2021-09-30 LAB
ALBUMIN SERPL-MCNC: 4.7 G/DL (ref 3.5–5.7)
ALP SERPL-CCNC: 50 U/L (ref 34–104)
ALT SERPL W P-5'-P-CCNC: 28 U/L (ref 7–52)
ANION GAP SERPL CALCULATED.3IONS-SCNC: 9 MMOL/L (ref 3–14)
AST SERPL W P-5'-P-CCNC: 27 U/L (ref 13–39)
BASOPHILS # BLD AUTO: 0 10E3/UL (ref 0–0.2)
BASOPHILS NFR BLD AUTO: 1 %
BILIRUB SERPL-MCNC: 0.6 MG/DL (ref 0.3–1)
BUN SERPL-MCNC: 19 MG/DL (ref 7–25)
CALCIUM SERPL-MCNC: 9.7 MG/DL (ref 8.6–10.3)
CHLORIDE BLD-SCNC: 102 MMOL/L (ref 98–107)
CHOLEST SERPL-MCNC: 202 MG/DL
CO2 SERPL-SCNC: 30 MMOL/L (ref 21–31)
CREAT SERPL-MCNC: 1.07 MG/DL (ref 0.7–1.3)
EOSINOPHIL # BLD AUTO: 0.1 10E3/UL (ref 0–0.7)
EOSINOPHIL NFR BLD AUTO: 1 %
ERYTHROCYTE [DISTWIDTH] IN BLOOD BY AUTOMATED COUNT: 12.4 % (ref 10–15)
FASTING STATUS PATIENT QL REPORTED: YES
GFR SERPL CREATININE-BSD FRML MDRD: 76 ML/MIN/1.73M2
GLUCOSE BLD-MCNC: 97 MG/DL (ref 70–105)
HCT VFR BLD AUTO: 41.8 % (ref 40–53)
HDLC SERPL-MCNC: 71 MG/DL (ref 23–92)
HGB BLD-MCNC: 14.2 G/DL (ref 13.3–17.7)
IMM GRANULOCYTES # BLD: 0 10E3/UL
IMM GRANULOCYTES NFR BLD: 0 %
LDLC SERPL CALC-MCNC: 113 MG/DL
LYMPHOCYTES # BLD AUTO: 1.2 10E3/UL (ref 0.8–5.3)
LYMPHOCYTES NFR BLD AUTO: 32 %
MCH RBC QN AUTO: 30.7 PG (ref 26.5–33)
MCHC RBC AUTO-ENTMCNC: 34 G/DL (ref 31.5–36.5)
MCV RBC AUTO: 91 FL (ref 78–100)
MONOCYTES # BLD AUTO: 0.5 10E3/UL (ref 0–1.3)
MONOCYTES NFR BLD AUTO: 12 %
NEUTROPHILS # BLD AUTO: 2.1 10E3/UL (ref 1.6–8.3)
NEUTROPHILS NFR BLD AUTO: 54 %
NONHDLC SERPL-MCNC: 131 MG/DL
NRBC # BLD AUTO: 0 10E3/UL
NRBC BLD AUTO-RTO: 0 /100
PLATELET # BLD AUTO: 220 10E3/UL (ref 150–450)
POTASSIUM BLD-SCNC: 4 MMOL/L (ref 3.5–5.1)
PROT SERPL-MCNC: 7.1 G/DL (ref 6.4–8.9)
PSA SERPL-MCNC: 1.72 UG/L (ref 0–4)
RBC # BLD AUTO: 4.62 10E6/UL (ref 4.4–5.9)
SODIUM SERPL-SCNC: 141 MMOL/L (ref 134–144)
TRIGL SERPL-MCNC: 89 MG/DL
WBC # BLD AUTO: 3.8 10E3/UL (ref 4–11)

## 2021-09-30 PROCEDURE — 85004 AUTOMATED DIFF WBC COUNT: CPT | Mod: ZL | Performed by: INTERNAL MEDICINE

## 2021-09-30 PROCEDURE — 82040 ASSAY OF SERUM ALBUMIN: CPT | Mod: ZL | Performed by: INTERNAL MEDICINE

## 2021-09-30 PROCEDURE — 80061 LIPID PANEL: CPT | Mod: ZL | Performed by: INTERNAL MEDICINE

## 2021-09-30 PROCEDURE — 90682 RIV4 VACC RECOMBINANT DNA IM: CPT | Performed by: INTERNAL MEDICINE

## 2021-09-30 PROCEDURE — 99396 PREV VISIT EST AGE 40-64: CPT | Mod: 25 | Performed by: INTERNAL MEDICINE

## 2021-09-30 PROCEDURE — 36415 COLL VENOUS BLD VENIPUNCTURE: CPT | Mod: ZL | Performed by: INTERNAL MEDICINE

## 2021-09-30 PROCEDURE — 84153 ASSAY OF PSA TOTAL: CPT | Mod: ZL | Performed by: INTERNAL MEDICINE

## 2021-09-30 PROCEDURE — 90471 IMMUNIZATION ADMIN: CPT | Performed by: INTERNAL MEDICINE

## 2021-09-30 RX ORDER — MONTELUKAST SODIUM 10 MG/1
10 TABLET ORAL AT BEDTIME
Qty: 90 TABLET | Refills: 3 | Status: SHIPPED | OUTPATIENT
Start: 2021-09-30 | End: 2022-10-11

## 2021-09-30 RX ORDER — ROSUVASTATIN CALCIUM 10 MG/1
10 TABLET, COATED ORAL DAILY
Qty: 90 TABLET | Refills: 3 | Status: SHIPPED | OUTPATIENT
Start: 2021-09-30 | End: 2022-10-11

## 2021-09-30 RX ORDER — HYDROCHLOROTHIAZIDE 25 MG/1
25 TABLET ORAL DAILY
Qty: 90 TABLET | Refills: 3 | Status: SHIPPED | OUTPATIENT
Start: 2021-09-30 | End: 2022-10-11

## 2021-09-30 RX ORDER — TAMSULOSIN HYDROCHLORIDE 0.4 MG/1
0.4 CAPSULE ORAL EVERY EVENING
Qty: 90 CAPSULE | Refills: 3 | Status: SHIPPED | OUTPATIENT
Start: 2021-09-30 | End: 2022-10-11

## 2021-09-30 RX ORDER — METOPROLOL SUCCINATE 100 MG/1
100 TABLET, EXTENDED RELEASE ORAL DAILY
Qty: 90 TABLET | Refills: 4 | Status: SHIPPED | OUTPATIENT
Start: 2021-09-30 | End: 2022-10-11

## 2021-09-30 ASSESSMENT — ENCOUNTER SYMPTOMS
DIARRHEA: 0
ABDOMINAL PAIN: 0
CHILLS: 0
WHEEZING: 0
NAUSEA: 0
ARTHRALGIAS: 1
DYSURIA: 0
SHORTNESS OF BREATH: 0
MYALGIAS: 0
AGITATION: 0
PALPITATIONS: 0
APNEA: 1
HEMATURIA: 0
WOUND: 0
FEVER: 0
VOMITING: 0
BRUISES/BLEEDS EASILY: 0
LIGHT-HEADEDNESS: 0
CONFUSION: 0
COUGH: 0
DIZZINESS: 0

## 2021-09-30 ASSESSMENT — PAIN SCALES - GENERAL: PAINLEVEL: NO PAIN (0)

## 2021-09-30 ASSESSMENT — MIFFLIN-ST. JEOR: SCORE: 2031.91

## 2021-09-30 NOTE — LETTER
October 4, 2021      Deandre Smyth  [address]  [place] [id number]        Dear ,    We are writing to inform you of your test results.    Cholesterol levels are little high.    PSA lab is normal.    Labs are otherwise stable.   Continue current medications.   Plan to recheck labs again in 1 year.    Electronically signed by:  Luis Fay MD  on October 4, 2021     Resulted Orders   Comprehensive Metabolic Panel   Result Value Ref Range    Sodium 141 134 - 144 mmol/L    Potassium 4.0 3.5 - 5.1 mmol/L    Chloride 102 98 - 107 mmol/L    Carbon Dioxide (CO2) 30 21 - 31 mmol/L    Anion Gap 9 3 - 14 mmol/L    Urea Nitrogen 19 7 - 25 mg/dL    Creatinine 1.07 0.70 - 1.30 mg/dL    Calcium 9.7 8.6 - 10.3 mg/dL    Glucose 97 70 - 105 mg/dL    Alkaline Phosphatase 50 34 - 104 U/L    AST 27 13 - 39 U/L    ALT 28 7 - 52 U/L    Protein Total 7.1 6.4 - 8.9 g/dL    Albumin 4.7 3.5 - 5.7 g/dL    Bilirubin Total 0.6 0.3 - 1.0 mg/dL    GFR Estimate 76 >60 mL/min/1.73m2      Comment:      As of July 11, 2021, eGFR is calculated by the CKD-EPI creatinine equation, without race adjustment. eGFR can be influenced by muscle mass, exercise, and diet. The reported eGFR is an estimation only and is only applicable if the renal function is stable.   Lipid Panel   Result Value Ref Range    Cholesterol 202 (H) <200 mg/dL    Triglycerides 89 <150 mg/dL    Direct Measure HDL 71 23 - 92 mg/dL    LDL Cholesterol Calculated 113 (H) <=100 mg/dL    Non HDL Cholesterol 131 (H) <130 mg/dL    Patient Fasting > 8hrs? Yes     Narrative    Cholesterol  Desirable:  <200 mg/dL    Triglycerides  Normal:  Less than 150 mg/dL  Borderline High:  150-199 mg/dL  High:  200-499 mg/dL  Very High:  Greater than or equal to 500 mg/dL    Direct Measure HDL  Female:  Greater than or equal to 50 mg/dL   Male:  Greater than or equal to 40 mg/dL    LDL Cholesterol  Desirable:  <100mg/dL  Above Desirable:  100-129 mg/dL   Borderline High:   130-159 mg/dL   High:  160-189 mg/dL   Very High:  >= 190 mg/dL    Non HDL Cholesterol  Desirable:  130 mg/dL  Above Desirable:  130-159 mg/dL  Borderline High:  160-189 mg/dL  High:  190-219 mg/dL  Very High:  Greater than or equal to 220 mg/dL   PSA Screen GH   Result Value Ref Range    Prostate Specific Antigen Screen 1.72 0.00 - 4.00 ug/L   CBC with platelets and differential   Result Value Ref Range    WBC Count 3.8 (L) 4.0 - 11.0 10e3/uL    RBC Count 4.62 4.40 - 5.90 10e6/uL    Hemoglobin 14.2 13.3 - 17.7 g/dL    Hematocrit 41.8 40.0 - 53.0 %    MCV 91 78 - 100 fL    MCH 30.7 26.5 - 33.0 pg    MCHC 34.0 31.5 - 36.5 g/dL    RDW 12.4 10.0 - 15.0 %    Platelet Count 220 150 - 450 10e3/uL    % Neutrophils 54 %    % Lymphocytes 32 %    % Monocytes 12 %    % Eosinophils 1 %    % Basophils 1 %    % Immature Granulocytes 0 %    NRBCs per 100 WBC 0 <1 /100    Absolute Neutrophils 2.1 1.6 - 8.3 10e3/uL    Absolute Lymphocytes 1.2 0.8 - 5.3 10e3/uL    Absolute Monocytes 0.5 0.0 - 1.3 10e3/uL    Absolute Eosinophils 0.1 0.0 - 0.7 10e3/uL    Absolute Basophils 0.0 0.0 - 0.2 10e3/uL    Absolute Immature Granulocytes 0.0 <=0.0 10e3/uL    Absolute NRBCs 0.0 10e3/uL       If you have any questions or concerns, please call the clinic at the number listed above.       Sincerely,      Luis Fay MD

## 2021-09-30 NOTE — NURSING NOTE
Chief Complaint   Patient presents with     Physical     Imm/Inj     Flu Shot       FOOD SECURITY SCREENING QUESTIONS  Hunger Vital Signs:  Within the past 12 months we worried whether our food would run out before we got money to buy more. Never  Within the past 12 months the food we bought just didn't last and we didn't have money to get more. Never  Rhea Grady LPN 9/30/2021 8:05 AM      Initial /70 (BP Location: Right arm, Patient Position: Sitting, Cuff Size: Adult Large)   Pulse 58   Temp 97  F (36.1  C) (Tympanic)   Resp 16   Ht 1.829 m (6')   Wt 117.4 kg (258 lb 12.8 oz)   SpO2 98%   BMI 35.10 kg/m   Estimated body mass index is 35.1 kg/m  as calculated from the following:    Height as of this encounter: 1.829 m (6').    Weight as of this encounter: 117.4 kg (258 lb 12.8 oz).  Medication Reconciliation: complete    Rhea Grady LPN

## 2021-09-30 NOTE — PATIENT INSTRUCTIONS
Blood pressure is well controlled.   Medications refilled.   Labs are pending.     Needing new CPAP machine.     Return in approximately 1 year, or sooner as needed for follow-up with Dr. Fay.  - Annual Follow-up / Physical - Medicare Annual Wellness Visit     Clinic : [phone]  Appointment line: [phone]

## 2021-09-30 NOTE — PROGRESS NOTES
SUBJECTIVE:   CC: Deandre Smyth is an 56 year old male who presents for preventative health visit.   Patient has been advised of split billing requirements and indicates understanding: Yes  HPI  Today's PHQ-2 Score:   PHQ-2 ( 1999 [employer]) 9/30/2021   Q1: Little interest or pleasure in doing things 0   Q2: Feeling down, depressed or hopeless 0   PHQ-2 Score 0     Abuse: Current or Past(Physical, Sexual or Emotional)- No  Do you feel safe in your environment? Yes    Social History     Tobacco Use     Smoking status: Never Smoker     Smokeless tobacco: Current User     Types: Snuff   Substance Use Topics     Alcohol use: Yes     Comment: 6 pack of beer per week on average     No flowsheet data found.    Last PSA:   Prostate Specific Antigen Screen   Date Value Ref Range Status   09/30/2021 1.72 0.00 - 4.00 ug/L Final       Reviewed orders with patient. Reviewed health maintenance and updated orders accordingly - Yes    Reviewed and updated as needed this visit by clinical staff  Tobacco  Allergies  Meds  Problems  Med Hx  Surg Hx  Fam Hx  Soc Hx        Reviewed and updated as needed this visit by Provider  Tobacco  Allergies  Meds  Problems  Med Hx  Surg Hx  Fam Hx         Review of Systems   Constitutional: Negative for chills and fever.   HENT: Negative for congestion and hearing loss.    Eyes: Negative for visual disturbance.   Respiratory: Positive for apnea (Uses CPAP nightly, finds helpful and beneficial -- Machine broke and needs replacement. having severe sleep issues since machine broke, needs replacement). Negative for cough, shortness of breath and wheezing.    Cardiovascular: Negative for chest pain and palpitations.   Gastrointestinal: Negative for abdominal pain, diarrhea, nausea and vomiting.   Endocrine: Negative for cold intolerance and heat intolerance.   Genitourinary: Negative for dysuria and hematuria.   Musculoskeletal: Positive for arthralgias (occasionally some heel pain).  Negative for myalgias.   Skin: Negative for rash and wound.   Allergic/Immunologic: Positive for environmental allergies. Negative for immunocompromised state.   Neurological: Negative for dizziness and light-headedness.   Hematological: Does not bruise/bleed easily.   Psychiatric/Behavioral: Negative for agitation and confusion.        OBJECTIVE:   /70 (BP Location: Right arm, Patient Position: Sitting, Cuff Size: Adult Large)   Pulse 58   Temp 97  F (36.1  C) (Tympanic)   Resp 16   Ht 1.829 m (6')   Wt 117.4 kg (258 lb 12.8 oz)   SpO2 98%   BMI 35.10 kg/m      Physical Exam  Constitutional:       General: He is not in acute distress.     Appearance: He is well-developed. He is not diaphoretic.   HENT:      Head: Normocephalic and atraumatic.   Eyes:      General: No scleral icterus.     Conjunctiva/sclera: Conjunctivae normal.   Neck:      Vascular: No carotid bruit.   Cardiovascular:      Rate and Rhythm: Normal rate and regular rhythm.      Pulses: Normal pulses.   Pulmonary:      Effort: Pulmonary effort is normal.      Breath sounds: Normal breath sounds.   Abdominal:      Palpations: Abdomen is soft.      Tenderness: There is no abdominal tenderness.   Musculoskeletal:         General: No deformity.      Cervical back: Neck supple.      Right lower leg: No edema.      Left lower leg: No edema.   Lymphadenopathy:      Cervical: No cervical adenopathy.   Skin:     General: Skin is warm and dry.      Coloration: Skin is not jaundiced.      Findings: No rash.   Neurological:      Mental Status: He is alert and oriented to person, place, and time. Mental status is at baseline.   Psychiatric:         Mood and Affect: Mood normal.         Behavior: Behavior normal.       Diagnostic Test Results:  Labs reviewed in Epic  Results for orders placed or performed in visit on 09/30/21   Comprehensive Metabolic Panel     Status: Normal   Result Value Ref Range    Sodium 141 134 - 144 mmol/L    Potassium 4.0 3.5  - 5.1 mmol/L    Chloride 102 98 - 107 mmol/L    Carbon Dioxide (CO2) 30 21 - 31 mmol/L    Anion Gap 9 3 - 14 mmol/L    Urea Nitrogen 19 7 - 25 mg/dL    Creatinine 1.07 0.70 - 1.30 mg/dL    Calcium 9.7 8.6 - 10.3 mg/dL    Glucose 97 70 - 105 mg/dL    Alkaline Phosphatase 50 34 - 104 U/L    AST 27 13 - 39 U/L    ALT 28 7 - 52 U/L    Protein Total 7.1 6.4 - 8.9 g/dL    Albumin 4.7 3.5 - 5.7 g/dL    Bilirubin Total 0.6 0.3 - 1.0 mg/dL    GFR Estimate 76 >60 mL/min/1.73m2   Lipid Panel     Status: Abnormal   Result Value Ref Range    Cholesterol 202 (H) <200 mg/dL    Triglycerides 89 <150 mg/dL    Direct Measure HDL 71 23 - 92 mg/dL    LDL Cholesterol Calculated 113 (H) <=100 mg/dL    Non HDL Cholesterol 131 (H) <130 mg/dL    Patient Fasting > 8hrs? Yes     Narrative    Cholesterol  Desirable:  <200 mg/dL    Triglycerides  Normal:  Less than 150 mg/dL  Borderline High:  150-199 mg/dL  High:  200-499 mg/dL  Very High:  Greater than or equal to 500 mg/dL    Direct Measure HDL  Female:  Greater than or equal to 50 mg/dL   Male:  Greater than or equal to 40 mg/dL    LDL Cholesterol  Desirable:  <100mg/dL  Above Desirable:  100-129 mg/dL   Borderline High:  130-159 mg/dL   High:  160-189 mg/dL   Very High:  >= 190 mg/dL    Non HDL Cholesterol  Desirable:  130 mg/dL  Above Desirable:  130-159 mg/dL  Borderline High:  160-189 mg/dL  High:  190-219 mg/dL  Very High:  Greater than or equal to 220 mg/dL   PSA Screen GH     Status: Normal   Result Value Ref Range    Prostate Specific Antigen Screen 1.72 0.00 - 4.00 ug/L   CBC with platelets and differential     Status: Abnormal   Result Value Ref Range    WBC Count 3.8 (L) 4.0 - 11.0 10e3/uL    RBC Count 4.62 4.40 - 5.90 10e6/uL    Hemoglobin 14.2 13.3 - 17.7 g/dL    Hematocrit 41.8 40.0 - 53.0 %    MCV 91 78 - 100 fL    MCH 30.7 26.5 - 33.0 pg    MCHC 34.0 31.5 - 36.5 g/dL    RDW 12.4 10.0 - 15.0 %    Platelet Count 220 150 - 450 10e3/uL    % Neutrophils 54 %    % Lymphocytes 32 %     % Monocytes 12 %    % Eosinophils 1 %    % Basophils 1 %    % Immature Granulocytes 0 %    NRBCs per 100 WBC 0 <1 /100    Absolute Neutrophils 2.1 1.6 - 8.3 10e3/uL    Absolute Lymphocytes 1.2 0.8 - 5.3 10e3/uL    Absolute Monocytes 0.5 0.0 - 1.3 10e3/uL    Absolute Eosinophils 0.1 0.0 - 0.7 10e3/uL    Absolute Basophils 0.0 0.0 - 0.2 10e3/uL    Absolute Immature Granulocytes 0.0 <=0.0 10e3/uL    Absolute NRBCs 0.0 10e3/uL   CBC with Platelets & Differential     Status: Abnormal    Narrative    The following orders were created for panel order CBC with Platelets & Differential.  Procedure                               Abnormality         Status                     ---------                               -----------         ------                     CBC with platelets and d...[[id number]]  Abnormal            Final result                 Please view results for these tests on the individual orders.      Total cholesterol and LDL cholesterol are not at goal.  Labs are otherwise all normal.  Blood count and kidney and liver tests are all stable.    ASSESSMENT/PLAN:       ICD-10-CM    1. Annual physical exam  Z00.00    2. Benign essential hypertension  I10 hydrochlorothiazide (HYDRODIURIL) 25 MG tablet     metoprolol succinate ER (TOPROL-XL) 100 MG 24 hr tablet     CBC with Platelets & Differential     Comprehensive Metabolic Panel     CBC with Platelets & Differential     Comprehensive Metabolic Panel   3. Allergic rhinitis, unspecified seasonality, unspecified trigger  J30.9 montelukast (SINGULAIR) 10 MG tablet   4. Plantar fasciitis  M72.2 nabumetone (RELAFEN) 750 MG tablet   5. Mixed hyperlipidemia  E78.2 rosuvastatin (CRESTOR) 10 MG tablet     Lipid Panel     Lipid Panel   6. Benign prostatic hyperplasia with urinary frequency  N40.1 tamsulosin (FLOMAX) 0.4 MG capsule    R35.0 PSA Screen GH     PSA Screen GH   7. Need for prophylactic vaccination and inoculation against influenza  Z23    8. Morbid obesity (H)   E66.01    9. GANESH on CPAP - uses nightly, finds helpful and beneficial  G47.33 CPAP Order for DME - ONLY FOR DME    Z99.89    10. Encounter for screening for malignant neoplasm of prostate  Z12.5 PSA Screen GH     PSA Screen GH   Patient presents for annual physical.  Overall doing quite well.  No new concerns other than his CPAP machine broke and he needs a replacement machine.  Patient was referred for new CPAP machine.  DME orders printed.  Prior to his machine breaking was using nightly and found very helpful and beneficial.  He is using a fixed pressure CPAP.  I sent in a prescription for a CPAP AutoPap today.  Continue with nasal pillow mask.    -- Updated orders for New CPAP machine with new supplies ordered today.     Hypertension, blood pressure currently well controlled.  Denies syncope or presyncope.  Doing well with current dosing.  Needs refills.  Check labs.    Allergic rhinitis, has found Singulair very helpful.  Continue current dosing.  Needs refills.    Plantar fasciitis, takes Relafen as needed for pain.  Continues with foot stretching and encouraged to continually replace his shoes or shoe inserts to help with padding.    Mixed hyperlipidemia, currently on Crestor 10 mg daily.  Cholesterol levels have improved but are still not at goal.  His LDL cholesterol is above goal and his total cholesterol is above goal.    Flu shot today.    BPH with urinary frequency.  Start trial of Flomax in the evening.  Check PSA.    Flu shot today.    Obesity, discussed need for reduced oral caloric intake.  Regular exercise.  Reduce carbohydrate intake.  Information printed and reviewed.    Vaccine counseling completed.  Encouraged COVID-19 third shot when available, flu shot today.    Patient has been advised of split billing requirements and indicates understanding: Yes  COUNSELING:   Reviewed preventive health counseling, as reflected in patient instructions  Special attention given to:        Regular exercise        Healthy diet/nutrition       Vision screening       Hearing screening       Immunizations       Aspirin prophylaxis     Estimated body mass index is 35.1 kg/m  as calculated from the following:    Height as of this encounter: 1.829 m (6').    Weight as of this encounter: 117.4 kg (258 lb 12.8 oz).     Weight management plan: Discussed healthy diet and exercise guidelines    He reports that he has never smoked. His smokeless tobacco use includes snuff.  Tobacco Cessation Action Plan:   Information offered: Patient not interested at this time  Self help information given to patient      Counseling Resources:  ATP IV Guidelines  Pooled Cohorts Equation Calculator  FRAX Risk Assessment  ICSI Preventive Guidelines  Dietary Guidelines for Americans, 2010  USDA's MyPlate  ASA Prophylaxis  Lung CA Screening    Luis Fay MD  [facility] AND [facility]

## 2021-10-01 NOTE — TELEPHONE ENCOUNTER
Spoke to patient he said the place at [place] said he needs face to face for his cpap.   Spoke to Luis with medical supply they need documentation for cpap faxed over.   Peggy Barcenas LPN .............10/1/2021     10:16 AM    Can AARTI send office visit notes to     Luis Hassan   [phone]  Fax [phone]

## 2021-10-01 NOTE — TELEPHONE ENCOUNTER
Patient just had a face to face visit on 9/30 -- for a new CPAP machine.     What forms are needed to be filled out?  He wanted to go to [place] for his supplies.     Please have them fax over the forms that needs to be signed.     Luis Fay MD

## 2022-05-05 DIAGNOSIS — N52.9 ERECTILE DYSFUNCTION, UNSPECIFIED ERECTILE DYSFUNCTION TYPE: ICD-10-CM

## 2022-05-09 RX ORDER — SILDENAFIL CITRATE 20 MG/1
TABLET ORAL
Qty: 30 TABLET | Refills: 11 | OUTPATIENT
Start: 2022-05-09

## 2022-09-17 ENCOUNTER — HEALTH MAINTENANCE LETTER (OUTPATIENT)
Age: 57
End: 2022-09-17

## 2022-10-11 ENCOUNTER — IMMUNIZATION (OUTPATIENT)
Dept: FAMILY MEDICINE | Facility: OTHER | Age: 57
End: 2022-10-11
Attending: INTERNAL MEDICINE
Payer: COMMERCIAL

## 2022-10-11 ENCOUNTER — OFFICE VISIT (OUTPATIENT)
Dept: INTERNAL MEDICINE | Facility: OTHER | Age: 57
End: 2022-10-11
Attending: INTERNAL MEDICINE
Payer: COMMERCIAL

## 2022-10-11 VITALS
WEIGHT: 250.5 LBS | OXYGEN SATURATION: 99 % | HEIGHT: 72 IN | TEMPERATURE: 98.2 F | SYSTOLIC BLOOD PRESSURE: 138 MMHG | HEART RATE: 70 BPM | BODY MASS INDEX: 33.93 KG/M2 | DIASTOLIC BLOOD PRESSURE: 74 MMHG | RESPIRATION RATE: 20 BRPM

## 2022-10-11 DIAGNOSIS — N52.9 ERECTILE DYSFUNCTION, UNSPECIFIED ERECTILE DYSFUNCTION TYPE: ICD-10-CM

## 2022-10-11 DIAGNOSIS — R73.9 ELEVATED RANDOM BLOOD GLUCOSE LEVEL: ICD-10-CM

## 2022-10-11 DIAGNOSIS — M72.2 PLANTAR FASCIITIS: ICD-10-CM

## 2022-10-11 DIAGNOSIS — G47.33 OSA ON CPAP: ICD-10-CM

## 2022-10-11 DIAGNOSIS — I10 BENIGN ESSENTIAL HYPERTENSION: ICD-10-CM

## 2022-10-11 DIAGNOSIS — N40.1 BENIGN PROSTATIC HYPERPLASIA WITH URINARY FREQUENCY: ICD-10-CM

## 2022-10-11 DIAGNOSIS — R35.0 BENIGN PROSTATIC HYPERPLASIA WITH URINARY FREQUENCY: ICD-10-CM

## 2022-10-11 DIAGNOSIS — J30.9 ALLERGIC RHINITIS, UNSPECIFIED SEASONALITY, UNSPECIFIED TRIGGER: ICD-10-CM

## 2022-10-11 DIAGNOSIS — Z23 HIGH PRIORITY FOR 2019-NCOV VACCINE: Primary | ICD-10-CM

## 2022-10-11 DIAGNOSIS — Z00.00 ANNUAL PHYSICAL EXAM: ICD-10-CM

## 2022-10-11 DIAGNOSIS — E78.2 MIXED HYPERLIPIDEMIA: Primary | ICD-10-CM

## 2022-10-11 DIAGNOSIS — E66.01 MORBID OBESITY (H): ICD-10-CM

## 2022-10-11 DIAGNOSIS — Z71.85 VACCINE COUNSELING: ICD-10-CM

## 2022-10-11 LAB
ALBUMIN SERPL-MCNC: 4.5 G/DL (ref 3.5–5.7)
ALP SERPL-CCNC: 52 U/L (ref 34–104)
ALT SERPL W P-5'-P-CCNC: 31 U/L (ref 7–52)
ANION GAP SERPL CALCULATED.3IONS-SCNC: 10 MMOL/L (ref 3–14)
AST SERPL W P-5'-P-CCNC: 29 U/L (ref 13–39)
BILIRUB SERPL-MCNC: 0.4 MG/DL (ref 0.3–1)
BUN SERPL-MCNC: 21 MG/DL (ref 7–25)
CALCIUM SERPL-MCNC: 9.9 MG/DL (ref 8.6–10.3)
CHLORIDE BLD-SCNC: 101 MMOL/L (ref 98–107)
CHOLEST SERPL-MCNC: 213 MG/DL
CO2 SERPL-SCNC: 29 MMOL/L (ref 21–31)
CREAT SERPL-MCNC: 0.98 MG/DL (ref 0.7–1.3)
ERYTHROCYTE [DISTWIDTH] IN BLOOD BY AUTOMATED COUNT: 12.5 % (ref 10–15)
FASTING STATUS PATIENT QL REPORTED: NO
GFR SERPL CREATININE-BSD FRML MDRD: 89 ML/MIN/1.73M2
GLUCOSE BLD-MCNC: 87 MG/DL (ref 70–105)
HBA1C MFR BLD: 5.6 % (ref 4–6.2)
HCT VFR BLD AUTO: 38.8 % (ref 40–53)
HDLC SERPL-MCNC: 75 MG/DL (ref 23–92)
HGB BLD-MCNC: 13.3 G/DL (ref 13.3–17.7)
HOLD SPECIMEN: NORMAL
LDLC SERPL CALC-MCNC: 99 MG/DL
MCH RBC QN AUTO: 30.9 PG (ref 26.5–33)
MCHC RBC AUTO-ENTMCNC: 34.3 G/DL (ref 31.5–36.5)
MCV RBC AUTO: 90 FL (ref 78–100)
NONHDLC SERPL-MCNC: 138 MG/DL
PLATELET # BLD AUTO: 213 10E3/UL (ref 150–450)
POTASSIUM BLD-SCNC: 4.5 MMOL/L (ref 3.5–5.1)
PROT SERPL-MCNC: 7.3 G/DL (ref 6.4–8.9)
RBC # BLD AUTO: 4.31 10E6/UL (ref 4.4–5.9)
SODIUM SERPL-SCNC: 140 MMOL/L (ref 134–144)
TRIGL SERPL-MCNC: 194 MG/DL
WBC # BLD AUTO: 6.6 10E3/UL (ref 4–11)

## 2022-10-11 PROCEDURE — 99396 PREV VISIT EST AGE 40-64: CPT | Performed by: INTERNAL MEDICINE

## 2022-10-11 PROCEDURE — 0124A COVID-19,PF,PFIZER BOOSTER BIVALENT: CPT

## 2022-10-11 PROCEDURE — 80053 COMPREHEN METABOLIC PANEL: CPT | Mod: ZL | Performed by: INTERNAL MEDICINE

## 2022-10-11 PROCEDURE — 80061 LIPID PANEL: CPT | Mod: ZL | Performed by: INTERNAL MEDICINE

## 2022-10-11 PROCEDURE — 82040 ASSAY OF SERUM ALBUMIN: CPT | Mod: ZL | Performed by: INTERNAL MEDICINE

## 2022-10-11 PROCEDURE — 90682 RIV4 VACC RECOMBINANT DNA IM: CPT | Performed by: INTERNAL MEDICINE

## 2022-10-11 PROCEDURE — 99214 OFFICE O/P EST MOD 30 MIN: CPT | Mod: 25 | Performed by: INTERNAL MEDICINE

## 2022-10-11 PROCEDURE — 85027 COMPLETE CBC AUTOMATED: CPT | Mod: ZL | Performed by: INTERNAL MEDICINE

## 2022-10-11 PROCEDURE — 91312 COVID-19,PF,PFIZER BOOSTER BIVALENT: CPT

## 2022-10-11 PROCEDURE — 36415 COLL VENOUS BLD VENIPUNCTURE: CPT | Mod: ZL | Performed by: INTERNAL MEDICINE

## 2022-10-11 PROCEDURE — 83036 HEMOGLOBIN GLYCOSYLATED A1C: CPT | Mod: ZL | Performed by: INTERNAL MEDICINE

## 2022-10-11 PROCEDURE — 90471 IMMUNIZATION ADMIN: CPT | Performed by: INTERNAL MEDICINE

## 2022-10-11 RX ORDER — MONTELUKAST SODIUM 10 MG/1
10 TABLET ORAL AT BEDTIME
Qty: 90 TABLET | Refills: 4 | Status: SHIPPED | OUTPATIENT
Start: 2022-10-11 | End: 2023-10-24

## 2022-10-11 RX ORDER — SILDENAFIL CITRATE 20 MG/1
TABLET ORAL
Qty: 30 TABLET | Refills: 11 | Status: SHIPPED | OUTPATIENT
Start: 2022-10-11 | End: 2023-10-24

## 2022-10-11 RX ORDER — HYDROCHLOROTHIAZIDE 25 MG/1
25 TABLET ORAL DAILY
Qty: 90 TABLET | Refills: 4 | Status: SHIPPED | OUTPATIENT
Start: 2022-10-11 | End: 2023-10-24

## 2022-10-11 RX ORDER — ROSUVASTATIN CALCIUM 10 MG/1
10 TABLET, COATED ORAL DAILY
Qty: 90 TABLET | Refills: 4 | Status: SHIPPED | OUTPATIENT
Start: 2022-10-11 | End: 2023-10-24

## 2022-10-11 RX ORDER — METOPROLOL SUCCINATE 100 MG/1
100 TABLET, EXTENDED RELEASE ORAL DAILY
Qty: 90 TABLET | Refills: 4 | Status: SHIPPED | OUTPATIENT
Start: 2022-10-11 | End: 2023-10-24

## 2022-10-11 RX ORDER — TAMSULOSIN HYDROCHLORIDE 0.4 MG/1
0.4 CAPSULE ORAL EVERY EVENING
Qty: 90 CAPSULE | Refills: 4 | Status: SHIPPED | OUTPATIENT
Start: 2022-10-11 | End: 2023-10-24

## 2022-10-11 ASSESSMENT — ENCOUNTER SYMPTOMS
FREQUENCY: 0
MYALGIAS: 0
DIARRHEA: 0
FEVER: 0
PARESTHESIAS: 0
APNEA: 1
WEAKNESS: 0
ARTHRALGIAS: 0
HEARTBURN: 0
PALPITATIONS: 0
JOINT SWELLING: 0
DIZZINESS: 0
HEADACHES: 0
CHILLS: 0
SHORTNESS OF BREATH: 0
ABDOMINAL PAIN: 0
CONSTIPATION: 0
HEMATURIA: 0
NAUSEA: 0
NERVOUS/ANXIOUS: 0
DYSURIA: 0
COUGH: 0
EYE PAIN: 0
SORE THROAT: 0
HEMATOCHEZIA: 0

## 2022-10-11 ASSESSMENT — PAIN SCALES - GENERAL: PAINLEVEL: NO PAIN (0)

## 2022-10-11 NOTE — PROGRESS NOTES
SUBJECTIVE:   CC: Cain is an 57 year old who presents for preventative health visit.     Patient has been advised of split billing requirements and indicates understanding: Yes  Healthy Habits:     Getting at least 3 servings of Calcium per day:  Yes    Bi-annual eye exam:  Yes    Dental care twice a year:  Yes    Sleep apnea or symptoms of sleep apnea:  Sleep apnea    Diet:  Regular (no restrictions)    Frequency of exercise:  None    Taking medications regularly:  Yes    Barriers to taking medications:  None    Medication side effects:  None    PHQ-2 Total Score: 0    Additional concerns today:  Yes      Today's PHQ-2 Score:   PHQ-2 ( 1999 [employer]) 10/11/2022   Q1: Little interest or pleasure in doing things 0   Q2: Feeling down, depressed or hopeless 0   PHQ-2 Score 0   PHQ-2 Total Score (12-17 Years)- Positive if 3 or more points; Administer PHQ-A if positive -   Q1: Little interest or pleasure in doing things Not at all   Q2: Feeling down, depressed or hopeless Not at all   PHQ-2 Score 0     Abuse: Current or Past(Physical, Sexual or Emotional)- No  Do you feel safe in your environment? Yes    Social History     Tobacco Use     Smoking status: Never     Smokeless tobacco: Current     Types: Snuff   Substance Use Topics     Alcohol use: Yes     Comment: 6 pack of beer per week on average     Alcohol Use 10/11/2022   Prescreen: >3 drinks/day or >7 drinks/week? No     Last PSA:   Prostate Specific Antigen Screen   Date Value Ref Range Status   09/30/2021 1.72 0.00 - 4.00 ug/L Final     Reviewed orders with patient. Reviewed health maintenance and updated orders accordingly - Yes    Reviewed and updated as needed this visit by clinical staff   Tobacco  Allergies  Meds  Problems  Med Hx  Surg Hx  Fam Hx  Soc   Hx        Reviewed and updated as needed this visit by Provider   Tobacco  Allergies  Meds  Problems  Med Hx  Surg Hx  Fam Hx  Soc   Hx         Review of Systems   Constitutional: Negative for  chills and fever.   HENT: Positive for hearing loss. Negative for congestion, ear pain and sore throat.    Eyes: Negative for pain and visual disturbance.   Respiratory: Positive for apnea (+ Uses CPAP nightly, finds helpful and beneficial). Negative for cough and shortness of breath.    Cardiovascular: Negative for chest pain, palpitations and peripheral edema.   Gastrointestinal: Negative for abdominal pain, constipation, diarrhea, heartburn, hematochezia and nausea.   Genitourinary: Positive for impotence. Negative for dysuria, frequency, genital sores, hematuria and urgency.   Musculoskeletal: Negative for arthralgias, joint swelling and myalgias.   Skin: Positive for rash.   Allergic/Immunologic: Negative for immunocompromised state.   Neurological: Negative for dizziness, weakness, headaches and paresthesias.   Psychiatric/Behavioral: Negative for mood changes. The patient is not nervous/anxious.        OBJECTIVE:   /74 (BP Location: Right arm, Patient Position: Sitting, Cuff Size: Adult Large)   Pulse 70   Temp 98.2  F (36.8  C) (Temporal)   Resp 20   Ht 1.829 m (6')   Wt 113.6 kg (250 lb 8 oz)   SpO2 99%   BMI 33.97 kg/m      Physical Exam  Constitutional:       General: He is not in acute distress.     Appearance: He is well-developed. He is not diaphoretic.   HENT:      Head: Normocephalic and atraumatic.   Eyes:      General: No scleral icterus.     Conjunctiva/sclera: Conjunctivae normal.   Neck:      Vascular: No carotid bruit.   Cardiovascular:      Rate and Rhythm: Normal rate and regular rhythm.      Pulses: Normal pulses.   Pulmonary:      Effort: Pulmonary effort is normal.      Breath sounds: Normal breath sounds.   Abdominal:      Palpations: Abdomen is soft.      Tenderness: There is no abdominal tenderness.   Musculoskeletal:         General: No deformity.      Cervical back: Neck supple.      Right lower leg: No edema.      Left lower leg: No edema.   Lymphadenopathy:       Cervical: No cervical adenopathy.   Skin:     General: Skin is warm and dry.      Coloration: Skin is not jaundiced.      Findings: No rash.   Neurological:      Mental Status: He is alert and oriented to person, place, and time. Mental status is at baseline.   Psychiatric:         Mood and Affect: Mood normal.         Behavior: Behavior normal.       Diagnostic Test Results:  Labs reviewed in Epic  Results for orders placed or performed in visit on 10/11/22   Comprehensive metabolic panel     Status: Normal   Result Value Ref Range    Sodium 140 134 - 144 mmol/L    Potassium 4.5 3.5 - 5.1 mmol/L    Chloride 101 98 - 107 mmol/L    Carbon Dioxide (CO2) 29 21 - 31 mmol/L    Anion Gap 10 3 - 14 mmol/L    Urea Nitrogen 21 7 - 25 mg/dL    Creatinine 0.98 0.70 - 1.30 mg/dL    Calcium 9.9 8.6 - 10.3 mg/dL    Glucose 87 70 - 105 mg/dL    Alkaline Phosphatase 52 34 - 104 U/L    AST 29 13 - 39 U/L    ALT 31 7 - 52 U/L    Protein Total 7.3 6.4 - 8.9 g/dL    Albumin 4.5 3.5 - 5.7 g/dL    Bilirubin Total 0.4 0.3 - 1.0 mg/dL    GFR Estimate 89 >60 mL/min/1.73m2   Lipid Profile     Status: Abnormal   Result Value Ref Range    Cholesterol 213 (H) <200 mg/dL    Triglycerides 194 (H) <150 mg/dL    Direct Measure HDL 75 23 - 92 mg/dL    LDL Cholesterol Calculated 99 <=100 mg/dL    Non HDL Cholesterol 138 (H) <130 mg/dL    Patient Fasting > 8hrs? No     Narrative    Cholesterol  Desirable:  <200 mg/dL    Triglycerides  Normal:  Less than 150 mg/dL  Borderline High:  150-199 mg/dL  High:  200-499 mg/dL  Very High:  Greater than or equal to 500 mg/dL    Direct Measure HDL  Female:  Greater than or equal to 50 mg/dL   Male:  Greater than or equal to 40 mg/dL    LDL Cholesterol  Desirable:  <100mg/dL  Above Desirable:  100-129 mg/dL   Borderline High:  130-159 mg/dL   High:  160-189 mg/dL   Very High:  >= 190 mg/dL    Non HDL Cholesterol  Desirable:  130 mg/dL  Above Desirable:  130-159 mg/dL  Borderline High:  160-189 mg/dL  High:   190-219 mg/dL  Very High:  Greater than or equal to 220 mg/dL   CBC with platelets     Status: Abnormal   Result Value Ref Range    WBC Count 6.6 4.0 - 11.0 10e3/uL    RBC Count 4.31 (L) 4.40 - 5.90 10e6/uL    Hemoglobin 13.3 13.3 - 17.7 g/dL    Hematocrit 38.8 (L) 40.0 - 53.0 %    MCV 90 78 - 100 fL    MCH 30.9 26.5 - 33.0 pg    MCHC 34.3 31.5 - 36.5 g/dL    RDW 12.5 10.0 - 15.0 %    Platelet Count 213 150 - 450 10e3/uL   Hemoglobin A1c     Status: Normal   Result Value Ref Range    Hemoglobin A1C 5.6 4.0 - 6.2 %   Extra SST Tube (LAB USE ONLY)     Status: None   Result Value Ref Range    Hold Specimen JIC       Hemoglobin A1c is normal.  CBC shows low RBC count, low hematocrit but normal hemoglobin, borderline low normal range.  Platelet count is low normal.  WBC count is normal.  LDL is at goal.  Triglycerides are high and not at goal.  Total cholesterol is high.  Liver enzymes are normal.  Creatinine is at baseline.  Glucose and electrolyte levels are normal.    ASSESSMENT/PLAN:       ICD-10-CM    1. Mixed hyperlipidemia  E78.2 rosuvastatin (CRESTOR) 10 MG tablet     Lipid Profile     Lipid Profile     Extra SST Tube (LAB USE ONLY)      2. Benign essential hypertension  I10 hydrochlorothiazide (HYDRODIURIL) 25 MG tablet     metoprolol succinate ER (TOPROL XL) 100 MG 24 hr tablet     CBC with platelets     Comprehensive metabolic panel     Comprehensive metabolic panel     CBC with platelets     Extra SST Tube (LAB USE ONLY)      3. Morbid obesity (H)  E66.01 Extra SST Tube (LAB USE ONLY)      4. Allergic rhinitis, unspecified seasonality, unspecified trigger  J30.9 montelukast (SINGULAIR) 10 MG tablet     Extra SST Tube (LAB USE ONLY)      5. GANESH on CPAP - uses nightly, finds helpful and beneficial  G47.33 Extra SST Tube (LAB USE ONLY)    Z99.89       6. Plantar fasciitis  M72.2 nabumetone (RELAFEN) 750 MG tablet     Extra SST Tube (LAB USE ONLY)      7. Benign prostatic hyperplasia with urinary frequency  N40.1  tamsulosin (FLOMAX) 0.4 MG capsule    R35.0 Extra SST Tube (LAB USE ONLY)      8. Erectile dysfunction, unspecified erectile dysfunction type  N52.9 sildenafil (REVATIO) 20 MG tablet     Extra SST Tube (LAB USE ONLY)      9. Elevated random blood glucose level  R73.09 Hemoglobin A1c     Hemoglobin A1c     Extra SST Tube (LAB USE ONLY)      10. Vaccine counseling  Z71.85 Extra SST Tube (LAB USE ONLY)      11. Annual physical exam  Z00.00       Patient presents for annual physical examination.  Overall reports doing relatively well.    Mixed hyperlipidemia.  Nonfasting lab work today.  LDL is at goal but triglycerides are high and not at goal.  Currently taking Crestor 10 mg daily.  Recommend continued lifestyle modifications, healthy diet, low carbohydrate diet, regular exercise, weight loss.  If lifestyle modifications or not adequate to improve cholesterol levels we will need to consider medication dose adjustment or changes.    Hypertension, blood pressure is currently well controlled.  Denies syncope or presyncope.  No history of medications.  Needs refills.  Check labs.    Obesity, discussed need for reduced oral caloric intake.  Regular exercise.  Reduce carbohydrate intake.  Information printed and reviewed.    Sleep apnea, chronic, ongoing.  Uses CPAP nightly.  Finds helpful and beneficial.  Encouraged continued use.    Allergic rhinitis, ongoing but has found significant improvement with Singulair tablets.  Takes 1 nightly.  Needs refills    Planter fasciitis.  This is an ongoing, intermittent issue.  Takes Rother tablets as needed.  Needs refills.  BPH with urinary frequency, weak urine stream.  Still using Flomax which has been reasonably effective.  Denies significant dizziness or lightheadedness or other side effects.  Needs refills.    Erectile dysfunction, chronic, ongoing.  Using Viagra without side effects or difficulties.  Needs refills.    Elevated random glucose, noted with previous lab work.   Hemoglobin A1c is within normal range today.    Vaccine counseling completed.  Encouraged consideration of COVID vaccination, flu shot.    Patient has been advised of split billing requirements and indicates understanding: Yes    COUNSELING:   Reviewed preventive health counseling, as reflected in patient instructions  Special attention given to:        Regular exercise       Healthy diet/nutrition       Vision screening       Hearing screening       Immunizations    Estimated body mass index is 33.97 kg/m  as calculated from the following:    Height as of this encounter: 1.829 m (6').    Weight as of this encounter: 113.6 kg (250 lb 8 oz).     Weight management plan: Discussed healthy diet and exercise guidelines    He reports that he has never smoked. His smokeless tobacco use includes snuff.    Review current opioid prescriptions    For a patient with a current opioid prescription:    Reviewed potential Opioid Use Disorder (OUD) risk factors: Yes     Evaluate their pain severity and current treatment plan:     Provide information on non-opioid treatment options:    Refer to a specialist, as appropriate:    Get more information on pain management in the [facility] Pain Management Best Practices Inter-agency Task Force Report    Screen for potential Substance Use Disorders (SUDs)    Reviewed the patient s potential risk factors for SUDs: Yes     Refer to treatment or specialist, as appropriate:     A screening tool isn t required but you may use one:  Find more information in the National [place] on Drug Abuse Screening and Assessment Tools Chart    Counseling Resources:  ATP IV Guidelines  Pooled Cohorts Equation Calculator  FRAX Risk Assessment  ICSI Preventive Guidelines  Dietary Guidelines for Americans, 2010  USDA's MyPlate  ASA Prophylaxis  Lung CA Screening    Luis Fay MD  [facility] AND [facility]

## 2022-10-11 NOTE — PATIENT INSTRUCTIONS
Glad you are doing so well!    Blood pressure is well controlled.   Medications refilled.   Labs are pending.     Shots up to date today.     Return in approximately 1 year, or sooner as needed for follow-up with Dr. Fay.  - Annual Follow-up / Physical    Clinic : [phone]  Appointment line: [phone]

## 2023-01-17 ENCOUNTER — TRANSFERRED RECORDS (OUTPATIENT)
Dept: HEALTH INFORMATION MANAGEMENT | Facility: OTHER | Age: 58
End: 2023-01-17
Payer: COMMERCIAL

## 2023-06-07 NOTE — NURSING NOTE
Chief Complaint  Cough (Pt reports coughing for three weeks now ) and Nose Bleed    Subjective          Ta Madison presents to [facility]  History of Present Illness  Diagnosed with Prostate cancer two years ago .  Following oncology and Urology.  No recent changes.     Hyperlipidemia  This is a chronic problem. Recent lipid tests were reviewed and are variable. Exacerbating diseases include obesity. Factors aggravating his hyperlipidemia include fatty foods. Pertinent negatives include no chest pain or shortness of breath. Current antihyperlipidemic treatment includes statins and diet change. Compliance problems include adherence to diet and adherence to exercise.    Hypertension  This is a chronic (tachycardia controlled with current medications) problem. The current episode started more than 1 year ago. The problem is controlled. Pertinent negatives include no chest pain, malaise/fatigue, palpitations, peripheral edema or shortness of breath. Risk factors for coronary artery disease include dyslipidemia. Past treatments include beta blockers. Current antihypertension treatment includes beta blockers. The current treatment provides significant improvement. Compliance problems include diet.    Heartburn  He complains of coughing and heartburn. He reports no chest pain or no wheezing. This is a chronic problem. The problem occurs frequently. The problem has been gradually worsening. The heartburn is of mild intensity. He has tried a PPI for the symptoms. The treatment provided moderate relief.   Cough  This is a new problem. The current episode started 1 to 4 weeks ago. The problem occurs every few minutes. The cough is Productive of sputum. Associated symptoms include heartburn. Pertinent negatives include no chest pain, hemoptysis, nasal congestion, postnasal drip, rhinorrhea, shortness of breath or wheezing. The treatment provided mild relief.     Objective   Vital Signs:   BP  Patient Information     Patient Name MRN Deandre Miller [id number] Male 1965      Nursing Note by Jannie Fink at 2017  8:30 AM     Author:  Jannie Fink Service:  (none) Author Type:  (none)     Filed:  2017  8:56 AM Encounter Date:  2017 Status:  Signed     :  Jannie Fink            Patient comes to the clinic today for medication review and refills.         "108/60 (BP Location: Right arm, Patient Position: Sitting)   Pulse 77   Temp 97.7 °F (36.5 °C) (Temporal)   Ht 175.3 cm (69\")   Wt 81.9 kg (180 lb 9.6 oz)   SpO2 96%   BMI 26.67 kg/m²     Physical Exam  Vitals and nursing note reviewed.   Constitutional:       General: He is not in acute distress.     Appearance: He is well-developed. He is obese. He is not ill-appearing.   HENT:      Head: Normocephalic.   Eyes:      General:         Right eye: No discharge.         Left eye: No discharge.      Conjunctiva/sclera: Conjunctivae normal.   Cardiovascular:      Rate and Rhythm: Normal rate and regular rhythm.      Heart sounds: Normal heart sounds. No murmur heard.  Pulmonary:      Effort: Pulmonary effort is normal.      Breath sounds: Normal breath sounds.   Abdominal:      General: Bowel sounds are normal.      Palpations: Abdomen is soft.   Musculoskeletal:      Right lower leg: No edema.      Left lower leg: No edema.   Skin:     General: Skin is warm and dry.      Capillary Refill: Capillary refill takes 2 to 3 seconds.      Findings: Rash present.   Neurological:      General: No focal deficit present.      Mental Status: He is alert and oriented to person, place, and time.      Cranial Nerves: No cranial nerve deficit.      Sensory: No sensory deficit.      Motor: No weakness.      Coordination: Coordination normal.      Gait: Gait normal.      Deep Tendon Reflexes: Reflexes normal.   Psychiatric:         Behavior: Behavior normal.      Result Review :   During this visit the following were done:  Labs Reviewed [x]    Labs Ordered [x]    Radiology Reports Reviewed []    Radiology Ordered []    PCP Records Reviewed []    Referring Provider Records Reviewed []    ER Records Reviewed []    Hospital Records Reviewed []    History Obtained From Family []    Radiology Images Reviewed []    Other Reviewed []    Records Requested []             Diagnoses and all orders for this visit:    1. Heartburn (Primary)  - "     Ambulatory Referral to Gastroenterology    2. Multiple allergies  -     albuterol sulfate  (90 Base) MCG/ACT inhaler; Inhale 2 puffs Every 6 (Six) Hours As Needed for Wheezing.  Dispense: 18 g; Refill: 5    3. Mixed hyperlipidemia  -     atorvastatin (LIPITOR) 20 MG tablet; Take 1 tablet by mouth Every Night.  Dispense: 90 tablet; Refill: 1  -     fenofibrate (TRICOR) 145 MG tablet; Take 1 tablet by mouth Daily.  Dispense: 90 tablet; Refill: 1  -     Comprehensive Metabolic Panel; Future  -     Lipid Panel; Future  -     Comprehensive Metabolic Panel  -     Lipid Panel    4. Neuropathic pruritus  -     hydrOXYzine (ATARAX) 50 MG tablet; Take 1 tablet by mouth 3 (Three) Times a Day As Needed for Itching.  Dispense: 90 tablet; Refill: 1    5. Tachycardia  -     metoprolol tartrate (LOPRESSOR) 25 MG tablet; Take 1 tablet by mouth Daily.  Dispense: 90 tablet; Refill: 1    6. Gastroesophageal reflux disease  -     pantoprazole (PROTONIX) 40 MG EC tablet; Take 1 tablet by mouth Daily.  Dispense: 90 tablet; Refill: 1    7. Bronchitis  -     montelukast (SINGULAIR) 10 MG tablet; Take 1 tablet by mouth Every Night.  Dispense: 90 tablet; Refill: 1  -     amoxicillin (AMOXIL) 875 MG tablet; Take 1 tablet by mouth 2 (Two) Times a Day.  Dispense: 20 tablet; Refill: 0    8. Vitamin D deficiency  -     vitamin D (ERGOCALCIFEROL) 1.25 MG (75874 UT) capsule capsule; Take 1 capsule by mouth 1 (One) Time Per Week.  Dispense: 5 capsule; Refill: 5    Continue with urology and oncology     BMI is >= 25 and < 30. (Overweight) The following options were offered after discussion: exercise counseling/recommendations and nutrition counseling/recommendations              Follow Up   Return in about 3 months (around 9/7/2023), or if symptoms worsen or fail to improve, for Recheck labs today.  Patient was given instructions and counseling regarding his condition or for health maintenance advice. Please see specific information pulled  into the AVS if appropriate.

## 2023-10-20 ENCOUNTER — MYC MEDICAL ADVICE (OUTPATIENT)
Dept: INTERNAL MEDICINE | Facility: OTHER | Age: 58
End: 2023-10-20
Payer: COMMERCIAL

## 2023-10-21 DIAGNOSIS — N52.9 ERECTILE DYSFUNCTION, UNSPECIFIED ERECTILE DYSFUNCTION TYPE: ICD-10-CM

## 2023-10-21 DIAGNOSIS — I10 BENIGN ESSENTIAL HYPERTENSION: ICD-10-CM

## 2023-10-23 ASSESSMENT — ENCOUNTER SYMPTOMS
EYE PAIN: 1
ABDOMINAL PAIN: 0
FEVER: 0
SORE THROAT: 0
PARESTHESIAS: 0
MYALGIAS: 1
COUGH: 0
CHILLS: 0
SHORTNESS OF BREATH: 0
HEMATOCHEZIA: 0
JOINT SWELLING: 0
NERVOUS/ANXIOUS: 1
WEAKNESS: 0
HEADACHES: 0
FREQUENCY: 0
CONSTIPATION: 0
HEMATURIA: 0
PALPITATIONS: 0
NAUSEA: 0
DIARRHEA: 0
DIZZINESS: 0
DYSURIA: 0
ARTHRALGIAS: 1
HEARTBURN: 0

## 2023-10-24 ENCOUNTER — OFFICE VISIT (OUTPATIENT)
Dept: INTERNAL MEDICINE | Facility: OTHER | Age: 58
End: 2023-10-24
Attending: INTERNAL MEDICINE
Payer: COMMERCIAL

## 2023-10-24 VITALS
DIASTOLIC BLOOD PRESSURE: 78 MMHG | TEMPERATURE: 97.2 F | BODY MASS INDEX: 33.88 KG/M2 | RESPIRATION RATE: 16 BRPM | HEART RATE: 75 BPM | WEIGHT: 255.6 LBS | HEIGHT: 73 IN | SYSTOLIC BLOOD PRESSURE: 138 MMHG | OXYGEN SATURATION: 96 %

## 2023-10-24 DIAGNOSIS — Z12.5 ENCOUNTER FOR SCREENING FOR MALIGNANT NEOPLASM OF PROSTATE: ICD-10-CM

## 2023-10-24 DIAGNOSIS — J30.9 ALLERGIC RHINITIS, UNSPECIFIED SEASONALITY, UNSPECIFIED TRIGGER: ICD-10-CM

## 2023-10-24 DIAGNOSIS — N52.9 ERECTILE DYSFUNCTION, UNSPECIFIED ERECTILE DYSFUNCTION TYPE: ICD-10-CM

## 2023-10-24 DIAGNOSIS — Z00.00 ANNUAL PHYSICAL EXAM: ICD-10-CM

## 2023-10-24 DIAGNOSIS — G47.33 OSA (OBSTRUCTIVE SLEEP APNEA): ICD-10-CM

## 2023-10-24 DIAGNOSIS — I10 BENIGN ESSENTIAL HYPERTENSION: ICD-10-CM

## 2023-10-24 DIAGNOSIS — N40.1 BENIGN PROSTATIC HYPERPLASIA WITH URINARY FREQUENCY: ICD-10-CM

## 2023-10-24 DIAGNOSIS — Z23 NEEDS FLU SHOT: ICD-10-CM

## 2023-10-24 DIAGNOSIS — M72.2 PLANTAR FASCIITIS: ICD-10-CM

## 2023-10-24 DIAGNOSIS — Z23 NEED FOR COVID-19 VACCINE: ICD-10-CM

## 2023-10-24 DIAGNOSIS — L71.0 PERIORAL DERMATITIS: ICD-10-CM

## 2023-10-24 DIAGNOSIS — M70.61 GREATER TROCHANTERIC BURSITIS OF RIGHT HIP: Primary | ICD-10-CM

## 2023-10-24 DIAGNOSIS — E78.2 MIXED HYPERLIPIDEMIA: ICD-10-CM

## 2023-10-24 DIAGNOSIS — E66.811 OBESITY (BMI 30.0-34.9): ICD-10-CM

## 2023-10-24 DIAGNOSIS — R35.0 BENIGN PROSTATIC HYPERPLASIA WITH URINARY FREQUENCY: ICD-10-CM

## 2023-10-24 DIAGNOSIS — Z71.85 VACCINE COUNSELING: ICD-10-CM

## 2023-10-24 LAB
ALBUMIN SERPL BCG-MCNC: 4.5 G/DL (ref 3.5–5.2)
ALP SERPL-CCNC: 63 U/L (ref 40–129)
ALT SERPL W P-5'-P-CCNC: 40 U/L (ref 0–70)
ANION GAP SERPL CALCULATED.3IONS-SCNC: 11 MMOL/L (ref 7–15)
AST SERPL W P-5'-P-CCNC: 39 U/L (ref 0–45)
BASOPHILS # BLD AUTO: 0 10E3/UL (ref 0–0.2)
BASOPHILS NFR BLD AUTO: 1 %
BILIRUB SERPL-MCNC: 0.3 MG/DL
BUN SERPL-MCNC: 11.2 MG/DL (ref 8–23)
CALCIUM SERPL-MCNC: 9 MG/DL (ref 8.8–10.2)
CHLORIDE SERPL-SCNC: 107 MMOL/L (ref 98–107)
CHOLEST SERPL-MCNC: 201 MG/DL
CREAT SERPL-MCNC: 0.86 MG/DL (ref 0.67–1.17)
DEPRECATED HCO3 PLAS-SCNC: 25 MMOL/L (ref 22–29)
EGFRCR SERPLBLD CKD-EPI 2021: >90 ML/MIN/1.73M2
EOSINOPHIL # BLD AUTO: 0 10E3/UL (ref 0–0.7)
EOSINOPHIL NFR BLD AUTO: 1 %
ERYTHROCYTE [DISTWIDTH] IN BLOOD BY AUTOMATED COUNT: 12.1 % (ref 10–15)
GLUCOSE SERPL-MCNC: 85 MG/DL (ref 70–99)
HCT VFR BLD AUTO: 38.9 % (ref 40–53)
HDLC SERPL-MCNC: 74 MG/DL
HGB BLD-MCNC: 13.5 G/DL (ref 13.3–17.7)
IMM GRANULOCYTES # BLD: 0 10E3/UL
IMM GRANULOCYTES NFR BLD: 0 %
LDLC SERPL CALC-MCNC: 90 MG/DL
LYMPHOCYTES # BLD AUTO: 1 10E3/UL (ref 0.8–5.3)
LYMPHOCYTES NFR BLD AUTO: 33 %
MCH RBC QN AUTO: 31.7 PG (ref 26.5–33)
MCHC RBC AUTO-ENTMCNC: 34.7 G/DL (ref 31.5–36.5)
MCV RBC AUTO: 91 FL (ref 78–100)
MONOCYTES # BLD AUTO: 0.4 10E3/UL (ref 0–1.3)
MONOCYTES NFR BLD AUTO: 13 %
NEUTROPHILS # BLD AUTO: 1.5 10E3/UL (ref 1.6–8.3)
NEUTROPHILS NFR BLD AUTO: 52 %
NONHDLC SERPL-MCNC: 127 MG/DL
NRBC # BLD AUTO: 0 10E3/UL
NRBC BLD AUTO-RTO: 0 /100
PLATELET # BLD AUTO: 171 10E3/UL (ref 150–450)
POTASSIUM SERPL-SCNC: 4.4 MMOL/L (ref 3.4–5.3)
PROT SERPL-MCNC: 6.9 G/DL (ref 6.4–8.3)
PSA SERPL DL<=0.01 NG/ML-MCNC: 3.7 NG/ML (ref 0–4.5)
RBC # BLD AUTO: 4.26 10E6/UL (ref 4.4–5.9)
SODIUM SERPL-SCNC: 143 MMOL/L (ref 135–145)
TRIGL SERPL-MCNC: 187 MG/DL
WBC # BLD AUTO: 2.9 10E3/UL (ref 4–11)

## 2023-10-24 PROCEDURE — 90480 ADMN SARSCOV2 VAC 1/ONLY CMP: CPT | Performed by: INTERNAL MEDICINE

## 2023-10-24 PROCEDURE — 99214 OFFICE O/P EST MOD 30 MIN: CPT | Mod: 25 | Performed by: INTERNAL MEDICINE

## 2023-10-24 PROCEDURE — 36415 COLL VENOUS BLD VENIPUNCTURE: CPT | Mod: ZL | Performed by: INTERNAL MEDICINE

## 2023-10-24 PROCEDURE — 80061 LIPID PANEL: CPT | Mod: ZL | Performed by: INTERNAL MEDICINE

## 2023-10-24 PROCEDURE — 91320 SARSCV2 VAC 30MCG TRS-SUC IM: CPT | Performed by: INTERNAL MEDICINE

## 2023-10-24 PROCEDURE — G0103 PSA SCREENING: HCPCS | Mod: ZL | Performed by: INTERNAL MEDICINE

## 2023-10-24 PROCEDURE — 90682 RIV4 VACC RECOMBINANT DNA IM: CPT | Performed by: INTERNAL MEDICINE

## 2023-10-24 PROCEDURE — 90471 IMMUNIZATION ADMIN: CPT | Performed by: INTERNAL MEDICINE

## 2023-10-24 PROCEDURE — 80053 COMPREHEN METABOLIC PANEL: CPT | Mod: ZL | Performed by: INTERNAL MEDICINE

## 2023-10-24 PROCEDURE — 84153 ASSAY OF PSA TOTAL: CPT | Mod: ZL | Performed by: INTERNAL MEDICINE

## 2023-10-24 PROCEDURE — 85025 COMPLETE CBC W/AUTO DIFF WBC: CPT | Mod: ZL | Performed by: INTERNAL MEDICINE

## 2023-10-24 PROCEDURE — 99396 PREV VISIT EST AGE 40-64: CPT | Performed by: INTERNAL MEDICINE

## 2023-10-24 RX ORDER — HYDROCHLOROTHIAZIDE 25 MG/1
25 TABLET ORAL DAILY
Qty: 90 TABLET | Refills: 4 | Status: SHIPPED | OUTPATIENT
Start: 2023-10-24

## 2023-10-24 RX ORDER — SILDENAFIL CITRATE 20 MG/1
TABLET ORAL
Qty: 30 TABLET | Refills: 11 | Status: SHIPPED | OUTPATIENT
Start: 2023-10-24

## 2023-10-24 RX ORDER — TAMSULOSIN HYDROCHLORIDE 0.4 MG/1
0.4 CAPSULE ORAL EVERY EVENING
Qty: 90 CAPSULE | Refills: 4 | Status: SHIPPED | OUTPATIENT
Start: 2023-10-24

## 2023-10-24 RX ORDER — MONTELUKAST SODIUM 10 MG/1
10 TABLET ORAL AT BEDTIME
Qty: 90 TABLET | Refills: 4 | Status: SHIPPED | OUTPATIENT
Start: 2023-10-24

## 2023-10-24 RX ORDER — ROSUVASTATIN CALCIUM 10 MG/1
10 TABLET, COATED ORAL DAILY
Qty: 90 TABLET | Refills: 4 | Status: SHIPPED | OUTPATIENT
Start: 2023-10-24

## 2023-10-24 RX ORDER — METOPROLOL SUCCINATE 100 MG/1
100 TABLET, EXTENDED RELEASE ORAL DAILY
Qty: 90 TABLET | Refills: 4 | Status: SHIPPED | OUTPATIENT
Start: 2023-10-24

## 2023-10-24 ASSESSMENT — ENCOUNTER SYMPTOMS
JOINT SWELLING: 0
NAUSEA: 0
CHILLS: 0
FEVER: 0
ABDOMINAL PAIN: 0
BRUISES/BLEEDS EASILY: 0
MYALGIAS: 1
SHORTNESS OF BREATH: 0
ARTHRALGIAS: 1
HEARTBURN: 0
PARESTHESIAS: 0
APNEA: 1
WEAKNESS: 0
HEADACHES: 0
HEMATOCHEZIA: 0
DYSURIA: 0
SORE THROAT: 0
COUGH: 0
DIARRHEA: 0
HEMATURIA: 0
PALPITATIONS: 0
NERVOUS/ANXIOUS: 1
DIZZINESS: 0
FREQUENCY: 0
EYE PAIN: 1
CONSTIPATION: 0

## 2023-10-24 ASSESSMENT — PAIN SCALES - GENERAL: PAINLEVEL: MODERATE PAIN (5)

## 2023-10-24 NOTE — PROGRESS NOTES
Assessment & Plan     ICD-10-CM    1. Greater trochanteric bursitis of right hip  M70.61 CANCELED: XR Drain/Inject Small Joint/Bursa      2. Benign essential hypertension  I10 hydrochlorothiazide (HYDRODIURIL) 25 MG tablet     metoprolol succinate ER (TOPROL XL) 100 MG 24 hr tablet     Comprehensive metabolic panel     CBC with Platelets & Differential     Comprehensive metabolic panel     CBC with Platelets & Differential      3. Mixed hyperlipidemia  E78.2 rosuvastatin (CRESTOR) 10 MG tablet     Lipid Profile     Lipid Profile      4. Obesity (BMI 30.0-34.9)  E66.9       5. GANESH (obstructive sleep apnea) - s/p INSPIRE device implant - 7/11/2023 - [facility]  G47.33       6. Vaccine counseling  Z71.85       7. Annual physical exam  Z00.00       8. Allergic rhinitis, unspecified seasonality, unspecified trigger  J30.9 montelukast (SINGULAIR) 10 MG tablet      9. Plantar fasciitis  M72.2 nabumetone (RELAFEN) 750 MG tablet      10. Erectile dysfunction, unspecified erectile dysfunction type  N52.9 sildenafil (REVATIO) 20 MG tablet      11. Benign prostatic hyperplasia with urinary frequency  N40.1 tamsulosin (FLOMAX) 0.4 MG capsule    R35.0       12. Encounter for screening for malignant neoplasm of prostate  Z12.5 Prostate Specific Antigen Screen     Prostate Specific Antigen Screen      13. Needs flu shot  Z23       14. Need for COVID-19 vaccine  Z23       15. Perioral dermatitis  L71.0         Patient presents for patient presents for annual physical examination as well as follow-up multiple issues.    Perioral dermatitis.  Currently wearing a beard and mustache.  Has rash within the facial hair, along the mouth and nose area.  Discussed starting some topical antifungal cream or antifungal medicated shampoo.  Follow-up as needed.    Right outer hip pain, previously diagnosed with greater trochanteric bursitis.  X-ray assisted bursitis steroid injection ordered.  Scheduling will contact patient to arrange  appointment.    Allergic rhinitis.  Chronic.  Ongoing.  Continues with active symptoms, somewhat seasonal but also with year-round allergies.  Singulair has been helpful and effective.  Refills    Planter fasciitis, quite painful at times.  Has found Relafen tablets helpful when needed.  Recommend continued home care, stretching, etc.  Follow-up as needed.    Erectile dysfunction, chronic.  Typically utilizes 3 tablets of sildenafil, total of 60 mg prior to intercourse.  Tolerating well.  No significant side effects.  Minimal flushing.  Refill sent to pharmacy.    BPH with urinary frequency, Flomax has been helpful and effective, has noted some improvement in symptoms.  Needs refills.    Prostate lab cancer screen, check PSA.    HYPERTENSION - Ongoing. Blood pressure is currently well controlled.  Medication side effects: None. Denies syncope or presyncope.  No changes for now. Continue current medications.   Medication list reviewed/updated. Refills completed as needed.      MIXED HYPERLIPIDEMIA.  Ongoing. LDL is at goal: Yes. Triglycerides are at goal: No.  Hopefully lifestyle modifications will improve cholesterol levels, otherwise we will need to consider additional medication dose adjustments or medication changes.  Medication side effects reported: None.   Continue current medications for now - Crestor 10 mg daily. Medication list reviewed/updated. Refills completed as needed.  Recent Labs   Lab Test 10/11/22  1624 09/30/21  0846   CHOL 213* 202*   HDL 75 71   LDL 99 113*   TRIG 194* 89        OBESITY - Ongoing. Discussed need for reduced oral caloric intake, weight loss, regular exercise and reduce carbohydrate/sugar intake.     Sleep Apnea, chronic, ongoing.  Uses INSPIRE implanted device nightly.  Finds helpful and beneficial.  Doing well since implantation 7/2023.      Vaccine counseling completed.  Encouraged routine / annual vaccinations.     Encouraged regular exercise.     Return in about 1 year  (around 10/24/2024) for Annual Physical Exam.    Luis Fay MD  [facility] AND [facility]     SUBJECTIVE:   CC: Cain is an 58 year old who presents for preventative health visit.       10/24/2023     7:55 AM   Additional Questions   Roomed by Moise MEYERS / VALDO   Accompanied by n/a       Healthy Habits:     Getting at least 3 servings of Calcium per day:  NO    Bi-annual eye exam:  Yes    Dental care twice a year:  Yes    Sleep apnea or symptoms of sleep apnea:  Sleep apnea    Diet:  Regular (no restrictions)    Duration of exercise:  Other    Taking medications regularly:  Yes    Barriers to taking medications:  None    Medication side effects:  None, No muscle aches and No significant flushing    Additional concerns today:  Yes      Today's PHQ-2 Score:       10/23/2023    11:45 AM   PHQ-2 ( 1999 Pfizer)   Q1: Little interest or pleasure in doing things 0   Q2: Feeling down, depressed or hopeless 0   PHQ-2 Score 0   Q1: Little interest or pleasure in doing things Not at all   Q2: Feeling down, depressed or hopeless Not at all   PHQ-2 Score 0     Social History     Tobacco Use    Smoking status: Never    Smokeless tobacco: Current     Types: Snuff   Substance Use Topics    Alcohol use: Yes     Comment: 6 pack of beer per week on average             10/23/2023    11:45 AM   Alcohol Use   Prescreen: >3 drinks/day or >7 drinks/week? Yes   AUDIT SCORE  16       Last PSA:   Prostate Specific Antigen Screen   Date Value Ref Range Status   10/24/2023 3.70 0.00 - 4.50 ng/mL Final   09/30/2021 1.72 0.00 - 4.00 ug/L Final       Reviewed orders with patient. Reviewed health maintenance and updated orders accordingly - Yes    Reviewed and updated as needed this visit by clinical staff   Tobacco  Allergies  Meds  Problems  Med Hx  Surg Hx  Fam Hx  Soc   Hx        Reviewed and updated as needed this visit by Provider   Tobacco  Allergies  Meds  Problems  Med Hx  Surg Hx  Fam Hx           Review of Systems  "  Constitutional:  Negative for chills and fever.   HENT:  Positive for hearing loss. Negative for congestion, ear pain and sore throat.    Eyes:  Positive for pain (Pressure in left eye at times) and visual disturbance.   Respiratory:  Positive for apnea (GANESH (obstructive sleep apnea) - s/p INSPIRE device implant - 7/11/2023 - [facility]). Negative for cough and shortness of breath.    Cardiovascular:  Negative for chest pain, palpitations and peripheral edema.   Gastrointestinal:  Negative for abdominal pain, constipation, diarrhea, heartburn, hematochezia and nausea.   Genitourinary:  Negative for dysuria, frequency, genital sores, hematuria, impotence, penile discharge and urgency.   Musculoskeletal:  Positive for arthralgias and myalgias. Negative for joint swelling.   Skin:  Negative for rash.   Allergic/Immunologic: Negative for immunocompromised state.   Neurological:  Negative for dizziness, weakness, headaches and paresthesias.   Hematological:  Does not bruise/bleed easily.   Psychiatric/Behavioral:  Negative for mood changes. The patient is nervous/anxious.      OBJECTIVE:   /78 (BP Location: Right arm, Patient Position: Sitting, Cuff Size: Adult Large)   Pulse 75   Temp 97.2  F (36.2  C) (Temporal)   Resp 16   Ht 1.854 m (6' 1\")   Wt 115.9 kg (255 lb 9.6 oz)   SpO2 96%   BMI 33.72 kg/m      Physical Exam  Constitutional:       General: He is not in acute distress.     Appearance: He is well-developed. He is obese. He is not diaphoretic.   HENT:      Head: Normocephalic and atraumatic.   Eyes:      General: No scleral icterus.     Conjunctiva/sclera: Conjunctivae normal.   Neck:      Vascular: No carotid bruit.   Cardiovascular:      Rate and Rhythm: Normal rate and regular rhythm.      Pulses: Normal pulses.   Pulmonary:      Effort: Pulmonary effort is normal.      Breath sounds: Normal breath sounds.   Abdominal:      Palpations: Abdomen is soft.      Tenderness: There is no " "abdominal tenderness.   Musculoskeletal:         General: Tenderness (right greater trochanteric bursitis tenderness to palpation) present. No deformity.      Cervical back: Neck supple.      Right lower leg: No edema.      Left lower leg: No edema.   Lymphadenopathy:      Cervical: No cervical adenopathy.   Skin:     General: Skin is warm and dry.      Coloration: Skin is not jaundiced.      Findings: Rash (mild yousif-oral dermatitis) present.   Neurological:      Mental Status: He is alert. Mental status is at baseline.   Psychiatric:         Mood and Affect: Mood normal.         Behavior: Behavior normal.         Diagnostic Test Results:  Labs reviewed in Epic      Patient has been advised of [employer] billing requirements and indicates understanding: Yes      COUNSELING:   Reviewed preventive health counseling, as reflected in patient instructions  Special attention given to:        Regular exercise       Healthy diet/nutrition       Vision screening       Hearing screening       Pneumococcal Vaccine        Immunizations    BMI:   Estimated body mass index is 33.72 kg/m  as calculated from the following:    Height as of this encounter: 1.854 m (6' 1\").    Weight as of this encounter: 115.9 kg (255 lb 9.6 oz).   Weight management plan: Discussed healthy diet and exercise guidelines      He reports that he has never smoked. His smokeless tobacco use includes snuff.            Luis Fay MD  [facility] AND \[facility] [place] [facility]\""  "

## 2023-10-24 NOTE — PATIENT INSTRUCTIONS
Blood pressure is well controlled.     Medications refilled.   Labs are pending.     Immunization History   Administered Date(s) Administered    COVID-19 12+ (2023-24) (Pfizer) 10/24/2023    COVID-19 Bivalent 12+ (Pfizer) 10/11/2022    COVID-19 MONOVALENT 12+ (Pfizer) 03/08/2021, 04/05/2021, 12/03/2021    COVID-19 Monovalent 12+ (Pfizer 2022) 05/04/2022    Influenza (IIV3) PF 01/13/2013, 10/06/2017    Influenza Vaccine 18-64 (Flublok) 10/19/2018, 09/30/2021, 10/11/2022, 10/24/2023    Influenza Vaccine >6 months (Alfuria,Fluzone) 10/06/2017, 10/19/2018, 10/04/2019, 09/30/2020    TDAP (Adacel,Boostrix) 05/06/2021    TDAP Vaccine (Boostrix) 12/12/2017    Td (Adult), Adsorbed 12/09/2010    Zoster recombinant adjuvanted (SHINGRIX) 10/14/2019, 12/16/2019      Consider:  -- Yearly - COVID-19 Vaccination shot   -- Annual Flu / Influenza vaccination - Every Fall (Starting about October 1st)    Consider:  -- RSV vaccine for age 60+   (If Medicare insurance - get vaccine at the Pharmacy.     Private insurance may be able to get in clinic with clinic shot nurse.)    -- Flu shot and COVID shots today.       -- Pneumococcal PCV 23 shot -- anytime.         Right outer hip bursitis injection ordered  - they will call with date/time of appointment.        Keep working to quit using Chew / Snuff.      -- Choose a quit date (within 1 month). Quitting smoking abruptly is more successful than gradually cutting back.   -- Tell everyone about it (friends, family, coworkers)   -- Think about when you smoke the most, and what you'll do during those times (eg when in the car, work breaks, etc)     Consider:   -- Start varenicline (Chantix) 1 week before your quit date   -- Start bupropion (Wellbutrin/Zyban) 1 week before your quit date -- Welbutrin 1 pill daily for 1 week then 1 pill twice daily      -- Stop smoking on quit date   -- Starting with quit date, use nicotine lozenges/gum as needed for cravings     -- Quit Plan - Call them in the  next 1-2 weeks to help you quit smoking.                        [phone])                        http://www.quitplan.com   -- http://smokefree.gov/       Start using some anti-fungal cream or anti-fungal medicated shampoo (Nizoral) - in the mustache / beard and nose area.       Return in approximately 1 year, or sooner as needed for follow-up with Dr. Fay.  - Annual Follow-up / Physical.    Clinic : [phone]  Appointment line: [phone]

## 2023-10-24 NOTE — NURSING NOTE
"Chief Complaint   Patient presents with    Physical         Initial /78 (BP Location: Right arm, Patient Position: Sitting, Cuff Size: Adult Large)   Pulse 75   Temp 97.2  F (36.2  C) (Temporal)   Resp 16   Ht 1.854 m (6' 1\")   Wt 115.9 kg (255 lb 9.6 oz)   SpO2 96%   BMI 33.72 kg/m   Estimated body mass index is 33.72 kg/m  as calculated from the following:    Height as of this encounter: 1.854 m (6' 1\").    Weight as of this encounter: 115.9 kg (255 lb 9.6 oz).       FOOD SECURITY SCREENING QUESTIONS:    The next two questions are to help us understand your food security.  If you are feeling you need any assistance in this area, we have resources available to support you today.    Hunger Vital Signs:  Within the past 12 months we worried whether our food would run out before we got money to buy more. Never  Within the past 12 months the food we bought just didn't last and we didn't have money to get more. Never    Advance Care Directive on file? no      Medication reconciliation complete.      Moise Ely,on 10/24/2023 at 8:01 AM          "

## 2023-10-24 NOTE — PROGRESS NOTES
Answers submitted by the patient for this visit:  Annual Preventive Visit (Submitted on 10/23/2023)  Chief Complaint: Annual Exam:  Getting at least 3 servings of Calcium per day:: NO  Diet:: Regular (no restrictions)  Taking medications regularly:: Yes  Medication side effects:: None, No muscle aches, No significant flushing  Bi-annual eye exam:: Yes  Dental care twice a year:: Yes  Sleep apnea or symptoms of sleep apnea:: Sleep apnea  abdominal pain: No  Blood in stool: No  Blood in urine: No  chest pain: No  chills: No  congestion: No  constipation: No  cough: No  diarrhea: No  dizziness: No  ear pain: No  eye pain: Yes  nervous/anxious: Yes  fever: No  frequency: No  genital sores: No  headaches: No  hearing loss: Yes  heartburn: No  arthralgias: Yes  joint swelling: No  peripheral edema: No  mood changes: No  myalgias: Yes  nausea: No  dysuria: No  palpitations: No  Skin sensation changes: No  sore throat: No  urgency: No  rash: No  shortness of breath: No  visual disturbance: Yes  weakness: No  impotence: No  penile discharge: No  Additional concerns today:: Yes  Exercise outside of work (Submitted on 10/23/2023)  Chief Complaint: Annual Exam:  Duration of exercise:: Other

## 2023-10-26 RX ORDER — HYDROCHLOROTHIAZIDE 25 MG/1
25 TABLET ORAL DAILY
Qty: 90 TABLET | Refills: 4 | OUTPATIENT
Start: 2023-10-26

## 2023-10-26 RX ORDER — SILDENAFIL CITRATE 20 MG/1
TABLET ORAL
Qty: 30 TABLET | Refills: 11 | OUTPATIENT
Start: 2023-10-26

## 2023-10-26 NOTE — TELEPHONE ENCOUNTER
SILDENAFIL 20MG TABLET   HYDROCHLOROTHIAZIDE 25MG TAB   Both medications refilled 1024/23 at office visit. Unable to complete prescription refill per RNMedication Refill Policy.................... Diamond Cardenas RN ....................  10/26/2023   4:33 PM

## 2023-10-30 ENCOUNTER — MYC MEDICAL ADVICE (OUTPATIENT)
Dept: INTERNAL MEDICINE | Facility: OTHER | Age: 58
End: 2023-10-30
Payer: COMMERCIAL

## 2023-10-31 NOTE — TELEPHONE ENCOUNTER
"Spoke to Partha this order is pending \"tech\", they will call patient to follow up and there is nothing further with this request per Partha.     Delmi Westfall RN on 10/31/2023 at 1:12 PM    "

## 2023-11-16 ENCOUNTER — HOSPITAL ENCOUNTER (OUTPATIENT)
Dept: GENERAL RADIOLOGY | Facility: OTHER | Age: 58
Discharge: HOME | End: 2023-11-16
Attending: INTERNAL MEDICINE | Admitting: INTERNAL MEDICINE
Payer: COMMERCIAL

## 2023-11-16 DIAGNOSIS — M70.61 GREATER TROCHANTERIC BURSITIS OF RIGHT HIP: ICD-10-CM

## 2023-11-16 PROCEDURE — 77002 NEEDLE LOCALIZATION BY XRAY: CPT

## 2023-11-16 PROCEDURE — 250N000009 HC RX 250: Performed by: RADIOLOGY

## 2023-11-16 PROCEDURE — 255N000002 HC RX 255 OP 636: Performed by: RADIOLOGY

## 2023-11-16 PROCEDURE — 250N000011 HC RX IP 250 OP 636: Mod: JZ | Performed by: RADIOLOGY

## 2023-11-16 RX ORDER — TRIAMCINOLONE ACETONIDE 40 MG/ML
40 INJECTION, SUSPENSION INTRA-ARTICULAR; INTRAMUSCULAR ONCE
Status: COMPLETED | OUTPATIENT
Start: 2023-11-16 | End: 2023-11-16

## 2023-11-16 RX ORDER — LIDOCAINE HYDROCHLORIDE 10 MG/ML
2 INJECTION, SOLUTION EPIDURAL; INFILTRATION; INTRACAUDAL; PERINEURAL ONCE
Status: COMPLETED | OUTPATIENT
Start: 2023-11-16 | End: 2023-11-16

## 2023-11-16 RX ORDER — BUPIVACAINE HYDROCHLORIDE 5 MG/ML
2 INJECTION, SOLUTION EPIDURAL; INTRACAUDAL ONCE
Status: COMPLETED | OUTPATIENT
Start: 2023-11-16 | End: 2023-11-16

## 2023-11-16 RX ADMIN — LIDOCAINE HYDROCHLORIDE 5 ML: 10 INJECTION, SOLUTION INFILTRATION; PERINEURAL at 09:30

## 2023-11-16 RX ADMIN — IOHEXOL 1 ML: 240 INJECTION, SOLUTION INTRATHECAL; INTRAVASCULAR; INTRAVENOUS; ORAL at 09:29

## 2023-11-16 RX ADMIN — TRIAMCINOLONE ACETONIDE 40 MG: 40 INJECTION, SUSPENSION INTRA-ARTICULAR; INTRAMUSCULAR at 09:30

## 2023-11-16 RX ADMIN — BUPIVACAINE HYDROCHLORIDE 3 MG: 5 INJECTION, SOLUTION EPIDURAL; INTRACAUDAL at 09:30

## 2023-12-29 ENCOUNTER — MYC MEDICAL ADVICE (OUTPATIENT)
Dept: INTERNAL MEDICINE | Facility: OTHER | Age: 58
End: 2023-12-29
Payer: COMMERCIAL

## 2023-12-29 DIAGNOSIS — F10.90 ALCOHOL USE DISORDER: Primary | ICD-10-CM

## 2023-12-29 RX ORDER — NALTREXONE HYDROCHLORIDE 50 MG/1
50 TABLET, FILM COATED ORAL DAILY
Qty: 30 TABLET | Refills: 5 | Status: SHIPPED | OUTPATIENT
Start: 2023-12-29 | End: 2024-08-06

## 2024-08-03 DIAGNOSIS — F10.90 ALCOHOL USE DISORDER: ICD-10-CM

## 2024-08-05 ENCOUNTER — MYC MEDICAL ADVICE (OUTPATIENT)
Dept: INTERNAL MEDICINE | Facility: OTHER | Age: 59
End: 2024-08-05
Payer: COMMERCIAL

## 2024-08-06 RX ORDER — NALTREXONE HYDROCHLORIDE 50 MG/1
50 TABLET, FILM COATED ORAL DAILY
Qty: 109 TABLET | Refills: 0 | Status: SHIPPED | OUTPATIENT
Start: 2024-08-06

## 2024-08-06 NOTE — TELEPHONE ENCOUNTER
A side effect of naltrexone is decreased appetite. I would like to see him for some labs as well just to make sure since 50 lbs is quite a bit. He can be seen by myself or Hannah sooner.

## 2024-08-06 NOTE — TELEPHONE ENCOUNTER
Rashmi MATA sent Rx request for the following:      Requested Prescriptions   Pending Prescriptions Disp Refills    naltrexone (DEPADE/REVIA) 50 MG tablet [Pharmacy Med Name: Naltrexone HCl 50 MG Oral Tablet (Depade, Revia)] 109 tablet 0     Sig: Take 1 Tablet by mouth one time a day.       There is no refill protocol information for this order          Last Prescription Date:   12/29/23  Last Fill Qty/Refills:         30, R-5    Last Office Visit:              10/24/23   Future Office visit:             Next 5 appointments (look out 90 days)      Oct 24, 2024 8:00 AM  (Arrive by 7:45 AM)  PHYSICAL with Luis Fay MD  [facility] ([facility] ) [address]  [place] [id number] [phone]           Routing refill request to provider for review/approval because:  Drug not on the [facility] refill protocol     Candida Frye RN on 8/6/2024 at 10:19 AM

## 2024-08-16 ENCOUNTER — OFFICE VISIT (OUTPATIENT)
Dept: INTERNAL MEDICINE | Facility: OTHER | Age: 59
End: 2024-08-16
Payer: COMMERCIAL

## 2024-08-16 VITALS
DIASTOLIC BLOOD PRESSURE: 84 MMHG | BODY MASS INDEX: 27.36 KG/M2 | TEMPERATURE: 98.1 F | RESPIRATION RATE: 16 BRPM | OXYGEN SATURATION: 98 % | SYSTOLIC BLOOD PRESSURE: 126 MMHG | WEIGHT: 207.4 LBS | HEART RATE: 60 BPM

## 2024-08-16 DIAGNOSIS — R82.998 DARK URINE: Primary | ICD-10-CM

## 2024-08-16 DIAGNOSIS — L65.9 HAIR LOSS: ICD-10-CM

## 2024-08-16 DIAGNOSIS — R63.4 WEIGHT LOSS: ICD-10-CM

## 2024-08-16 LAB
ALBUMIN SERPL BCG-MCNC: 4.6 G/DL (ref 3.5–5.2)
ALBUMIN UR-MCNC: NEGATIVE MG/DL
ALP SERPL-CCNC: 61 U/L (ref 40–150)
ALT SERPL W P-5'-P-CCNC: 28 U/L (ref 0–70)
AMYLASE SERPL-CCNC: 53 U/L (ref 28–100)
ANION GAP SERPL CALCULATED.3IONS-SCNC: 8 MMOL/L (ref 7–15)
APPEARANCE UR: CLEAR
AST SERPL W P-5'-P-CCNC: 30 U/L (ref 0–45)
BILIRUB SERPL-MCNC: 0.4 MG/DL
BILIRUB UR QL STRIP: NEGATIVE
BUN SERPL-MCNC: 12.1 MG/DL (ref 8–23)
CALCIUM SERPL-MCNC: 9.5 MG/DL (ref 8.8–10.4)
CHLORIDE SERPL-SCNC: 103 MMOL/L (ref 98–107)
CK SERPL-CCNC: 229 U/L (ref 39–308)
COLOR UR AUTO: ABNORMAL
CREAT SERPL-MCNC: 0.93 MG/DL (ref 0.67–1.17)
CREAT UR-MCNC: 155.7 MG/DL
EGFRCR SERPLBLD CKD-EPI 2021: >90 ML/MIN/1.73M2
ERYTHROCYTE [DISTWIDTH] IN BLOOD BY AUTOMATED COUNT: 12.1 % (ref 10–15)
GLUCOSE SERPL-MCNC: 93 MG/DL (ref 70–99)
GLUCOSE UR STRIP-MCNC: NEGATIVE MG/DL
HCO3 SERPL-SCNC: 29 MMOL/L (ref 22–29)
HCT VFR BLD AUTO: 40 % (ref 40–53)
HGB BLD-MCNC: 13.4 G/DL (ref 13.3–17.7)
HGB UR QL STRIP: NEGATIVE
HYALINE CASTS: 1 /LPF
KETONES UR STRIP-MCNC: NEGATIVE MG/DL
LEUKOCYTE ESTERASE UR QL STRIP: NEGATIVE
LIPASE SERPL-CCNC: 31 U/L (ref 13–60)
MCH RBC QN AUTO: 31.5 PG (ref 26.5–33)
MCHC RBC AUTO-ENTMCNC: 33.5 G/DL (ref 31.5–36.5)
MCV RBC AUTO: 94 FL (ref 78–100)
MICROALBUMIN UR-MCNC: 20.2 MG/L
MICROALBUMIN/CREAT UR: 12.97 MG/G CR (ref 0–17)
MUCOUS THREADS #/AREA URNS LPF: PRESENT /LPF
NITRATE UR QL: NEGATIVE
PH UR STRIP: 6 [PH] (ref 5–9)
PLATELET # BLD AUTO: 220 10E3/UL (ref 150–450)
POTASSIUM SERPL-SCNC: 4.3 MMOL/L (ref 3.4–5.3)
PROT SERPL-MCNC: 6.9 G/DL (ref 6.4–8.3)
RBC # BLD AUTO: 4.25 10E6/UL (ref 4.4–5.9)
RBC URINE: 1 /HPF
SODIUM SERPL-SCNC: 140 MMOL/L (ref 135–145)
SP GR UR STRIP: 1.02 (ref 1–1.03)
TSH SERPL DL<=0.005 MIU/L-ACNC: 0.92 UIU/ML (ref 0.3–4.2)
UROBILINOGEN UR STRIP-MCNC: 2 MG/DL
WBC # BLD AUTO: 4 10E3/UL (ref 4–11)
WBC URINE: <1 /HPF

## 2024-08-16 PROCEDURE — 81001 URINALYSIS AUTO W/SCOPE: CPT | Mod: ZL

## 2024-08-16 PROCEDURE — 82150 ASSAY OF AMYLASE: CPT | Mod: ZL

## 2024-08-16 PROCEDURE — 99214 OFFICE O/P EST MOD 30 MIN: CPT

## 2024-08-16 PROCEDURE — 82565 ASSAY OF CREATININE: CPT | Mod: ZL

## 2024-08-16 PROCEDURE — 36415 COLL VENOUS BLD VENIPUNCTURE: CPT | Mod: ZL

## 2024-08-16 PROCEDURE — 84450 TRANSFERASE (AST) (SGOT): CPT | Mod: ZL

## 2024-08-16 PROCEDURE — 84443 ASSAY THYROID STIM HORMONE: CPT | Mod: ZL

## 2024-08-16 PROCEDURE — 82550 ASSAY OF CK (CPK): CPT | Mod: ZL

## 2024-08-16 PROCEDURE — 83690 ASSAY OF LIPASE: CPT | Mod: ZL

## 2024-08-16 PROCEDURE — G2211 COMPLEX E/M VISIT ADD ON: HCPCS

## 2024-08-16 PROCEDURE — 85027 COMPLETE CBC AUTOMATED: CPT | Mod: ZL

## 2024-08-16 PROCEDURE — 82570 ASSAY OF URINE CREATININE: CPT | Mod: ZL

## 2024-08-16 ASSESSMENT — PAIN SCALES - GENERAL: PAINLEVEL: NO PAIN (0)

## 2024-08-16 NOTE — NURSING NOTE
Chief Complaint   Patient presents with    Weight Loss         Medication Reconciliation: complete    Chio Hoover, LPN

## 2024-08-16 NOTE — PROGRESS NOTES
Subjective   Cain is a 59 year old, presenting for the following health issues:  Weight Loss      8/16/2024    10:58 AM   Additional Questions   Roomed by Chio Hoover   1. Dark urine  New dark urine. No other signs or symptoms of UTI. Denies blood in urine.  - UA with Microscopic reflex to Culture; Future  - Amylase; Future  - Lipase; Future  - Albumin Random Urine Quantitative with Creat Ratio; Future  - UA with Microscopic reflex to Culture  - Amylase  - Lipase  - Albumin Random Urine Quantitative with Creat Ratio  - CK Total; Future    2. Hair loss  Potentially related to weight loss/stress. Will check labs for any other potential causes. Encouraged healthy, well balanced, diet.  - Comprehensive Metabolic Panel; Future  - TSH Reflex GH; Future  - Comprehensive Metabolic Panel  - TSH Reflex GH    3. Weight loss  Has been on Naltrexone for alcohol use. States that he is drinking a lot less beer and has been eating less as well. I discussed with him that Naltrexone can cause weight loss as it curbs cravings.  - Comprehensive Metabolic Panel; Future  - CBC W PLT No Diff; Future  - Comprehensive Metabolic Panel  - CBC W PLT No Diff    The longitudinal plan of care for the diagnosis(es)/condition(s) as documented were addressed during this visit. Due to the added complexity in care, I will continue to support Cain in the subsequent management and with ongoing continuity of care.    History of Present Illness       Reason for visit:  Rapid weight loss of 50#, more than normal hair thinning/loss, change in BMs and urine color - all after starting Naltrexone Rx.  Is my health okay?  Am I low in any nutrient/vitamins?    He eats 0-1 servings of fruits and vegetables daily.He consumes 0 sweetened beverage(s) daily.   He is taking medications regularly.     Weight loss of approx 50 lb  Drinking much less ETOH  Slight increase of hair shedding  Reports dark urine    Wt Readings from Last 4 Encounters:   08/16/24 94.1 kg  (207 lb 6.4 oz)   10/24/23 115.9 kg (255 lb 9.6 oz)   10/11/22 113.6 kg (250 lb 8 oz)   09/30/21 117.4 kg (258 lb 12.8 oz)        Review of Systems  CONSTITUTIONAL:weight loss  INTEGUMENTARY/SKIN: hair loss  EYES: NEGATIVE for vision changes or irritation  ENT/MOUTH: NEGATIVE for ear, mouth and throat problems  RESP: NEGATIVE for significant cough or SOB  CV: NEGATIVE for chest pain, palpitations or peripheral edema  GI: NEGATIVE for nausea, abdominal pain, heartburn, or change in bowel habits   male :dark urine  MUSCULOSKELETAL: NEGATIVE for significant arthralgias or myalgia  NEURO: NEGATIVE for weakness, dizziness or paresthesias  ENDOCRINE: NEGATIVE for temperature intolerance, skin/hair changes  HEME: NEGATIVE for bleeding problems  PSYCHIATRIC: NEGATIVE for changes in mood or affect      Objective    There were no vitals taken for this visit.  There is no height or weight on file to calculate BMI.  Physical Exam  Constitutional:       General: He is not in acute distress.     Appearance: Normal appearance. He is not ill-appearing.   HENT:      Head: Normocephalic.   Cardiovascular:      Rate and Rhythm: Normal rate.      Pulses: Normal pulses.   Pulmonary:      Effort: Pulmonary effort is normal.   Musculoskeletal:         General: Normal range of motion.      Right lower leg: No edema.      Left lower leg: No edema.   Skin:     General: Skin is warm and dry.   Neurological:      Mental Status: He is alert and oriented to person, place, and time.   Psychiatric:         Behavior: Behavior normal.        Signed Electronically by: DIO Buckner CNP

## 2024-09-24 ENCOUNTER — MYC MEDICAL ADVICE (OUTPATIENT)
Dept: INTERNAL MEDICINE | Facility: OTHER | Age: 59
End: 2024-09-24
Payer: COMMERCIAL

## 2024-09-24 NOTE — TELEPHONE ENCOUNTER
Called [facility] to cancel 10:25 physical on same day as 7:45 physical is scheduled (both with Sumeet).      [facility]     Valeria Feldman RN on 9/24/2024 at 4:32 PM

## 2024-10-24 ENCOUNTER — OFFICE VISIT (OUTPATIENT)
Dept: INTERNAL MEDICINE | Facility: OTHER | Age: 59
End: 2024-10-24
Attending: INTERNAL MEDICINE
Payer: COMMERCIAL

## 2024-10-24 VITALS
HEART RATE: 63 BPM | WEIGHT: 211.4 LBS | BODY MASS INDEX: 28.63 KG/M2 | HEIGHT: 72 IN | TEMPERATURE: 97.3 F | RESPIRATION RATE: 16 BRPM | DIASTOLIC BLOOD PRESSURE: 74 MMHG | SYSTOLIC BLOOD PRESSURE: 114 MMHG | OXYGEN SATURATION: 99 %

## 2024-10-24 DIAGNOSIS — E78.2 MIXED HYPERLIPIDEMIA: ICD-10-CM

## 2024-10-24 DIAGNOSIS — G47.33 OSA (OBSTRUCTIVE SLEEP APNEA): ICD-10-CM

## 2024-10-24 DIAGNOSIS — Z00.00 ANNUAL PHYSICAL EXAM: Primary | ICD-10-CM

## 2024-10-24 DIAGNOSIS — Z71.85 VACCINE COUNSELING: ICD-10-CM

## 2024-10-24 DIAGNOSIS — I10 BENIGN ESSENTIAL HYPERTENSION: ICD-10-CM

## 2024-10-24 DIAGNOSIS — Z23 NEEDS FLU SHOT: ICD-10-CM

## 2024-10-24 DIAGNOSIS — N52.9 ERECTILE DYSFUNCTION, UNSPECIFIED ERECTILE DYSFUNCTION TYPE: ICD-10-CM

## 2024-10-24 DIAGNOSIS — J30.9 ALLERGIC RHINITIS, UNSPECIFIED SEASONALITY, UNSPECIFIED TRIGGER: ICD-10-CM

## 2024-10-24 DIAGNOSIS — N40.1 BENIGN PROSTATIC HYPERPLASIA WITH URINARY FREQUENCY: ICD-10-CM

## 2024-10-24 DIAGNOSIS — Z12.5 ENCOUNTER FOR SCREENING FOR MALIGNANT NEOPLASM OF PROSTATE: ICD-10-CM

## 2024-10-24 DIAGNOSIS — M72.2 PLANTAR FASCIITIS: ICD-10-CM

## 2024-10-24 DIAGNOSIS — R35.0 BENIGN PROSTATIC HYPERPLASIA WITH URINARY FREQUENCY: ICD-10-CM

## 2024-10-24 DIAGNOSIS — L71.0 PERIORAL DERMATITIS: ICD-10-CM

## 2024-10-24 DIAGNOSIS — Z23 NEED FOR COVID-19 VACCINE: ICD-10-CM

## 2024-10-24 LAB
CHOLEST SERPL-MCNC: 190 MG/DL
FASTING STATUS PATIENT QL REPORTED: YES
HDLC SERPL-MCNC: 84 MG/DL
LDLC SERPL CALC-MCNC: 91 MG/DL
NONHDLC SERPL-MCNC: 106 MG/DL
PSA SERPL DL<=0.01 NG/ML-MCNC: 2.49 NG/ML (ref 0–4.5)
TRIGL SERPL-MCNC: 74 MG/DL

## 2024-10-24 PROCEDURE — 90480 ADMN SARSCOV2 VAC 1/ONLY CMP: CPT | Performed by: INTERNAL MEDICINE

## 2024-10-24 PROCEDURE — 90673 RIV3 VACCINE NO PRESERV IM: CPT | Performed by: INTERNAL MEDICINE

## 2024-10-24 PROCEDURE — 99396 PREV VISIT EST AGE 40-64: CPT | Mod: 25 | Performed by: INTERNAL MEDICINE

## 2024-10-24 PROCEDURE — 36415 COLL VENOUS BLD VENIPUNCTURE: CPT | Mod: ZL | Performed by: INTERNAL MEDICINE

## 2024-10-24 PROCEDURE — G0103 PSA SCREENING: HCPCS | Mod: ZL | Performed by: INTERNAL MEDICINE

## 2024-10-24 PROCEDURE — 99214 OFFICE O/P EST MOD 30 MIN: CPT | Mod: 25 | Performed by: INTERNAL MEDICINE

## 2024-10-24 PROCEDURE — 91320 SARSCV2 VAC 30MCG TRS-SUC IM: CPT | Performed by: INTERNAL MEDICINE

## 2024-10-24 PROCEDURE — 80061 LIPID PANEL: CPT | Mod: ZL | Performed by: INTERNAL MEDICINE

## 2024-10-24 PROCEDURE — 90471 IMMUNIZATION ADMIN: CPT | Performed by: INTERNAL MEDICINE

## 2024-10-24 RX ORDER — SILDENAFIL CITRATE 20 MG/1
TABLET ORAL
Qty: 30 TABLET | Refills: 11 | Status: SHIPPED | OUTPATIENT
Start: 2024-10-24

## 2024-10-24 RX ORDER — MONTELUKAST SODIUM 10 MG/1
10 TABLET ORAL AT BEDTIME
Qty: 90 TABLET | Refills: 4 | Status: SHIPPED | OUTPATIENT
Start: 2024-10-24

## 2024-10-24 RX ORDER — HYDROCHLOROTHIAZIDE 25 MG/1
25 TABLET ORAL DAILY
Qty: 90 TABLET | Refills: 4 | Status: SHIPPED | OUTPATIENT
Start: 2024-10-24

## 2024-10-24 RX ORDER — METOPROLOL SUCCINATE 100 MG/1
100 TABLET, EXTENDED RELEASE ORAL DAILY
Qty: 90 TABLET | Refills: 4 | Status: SHIPPED | OUTPATIENT
Start: 2024-10-24

## 2024-10-24 RX ORDER — TAMSULOSIN HYDROCHLORIDE 0.4 MG/1
0.4 CAPSULE ORAL EVERY EVENING
Qty: 90 CAPSULE | Refills: 4 | Status: SHIPPED | OUTPATIENT
Start: 2024-10-24

## 2024-10-24 RX ORDER — ROSUVASTATIN CALCIUM 10 MG/1
10 TABLET, COATED ORAL DAILY
Qty: 90 TABLET | Refills: 4 | Status: SHIPPED | OUTPATIENT
Start: 2024-10-24

## 2024-10-24 SDOH — HEALTH STABILITY: PHYSICAL HEALTH: ON AVERAGE, HOW MANY MINUTES DO YOU ENGAGE IN EXERCISE AT THIS LEVEL?: 60 MIN

## 2024-10-24 SDOH — HEALTH STABILITY: PHYSICAL HEALTH: ON AVERAGE, HOW MANY DAYS PER WEEK DO YOU ENGAGE IN MODERATE TO STRENUOUS EXERCISE (LIKE A BRISK WALK)?: 5 DAYS

## 2024-10-24 ASSESSMENT — ENCOUNTER SYMPTOMS
MYALGIAS: 0
JOINT SWELLING: 0
HEARTBURN: 0
COUGH: 0
EYE PAIN: 0
ARTHRALGIAS: 1
HEADACHES: 0
FREQUENCY: 0
DIARRHEA: 0
APNEA: 1
PALPITATIONS: 0
SORE THROAT: 0
DYSURIA: 0
HEMATOCHEZIA: 0
DIZZINESS: 0
SHORTNESS OF BREATH: 0
CHILLS: 0
WEAKNESS: 0
NERVOUS/ANXIOUS: 1
FEVER: 0
NAUSEA: 0
ABDOMINAL PAIN: 0
BRUISES/BLEEDS EASILY: 0
CONSTIPATION: 0
PARESTHESIAS: 0
HEMATURIA: 0

## 2024-10-24 ASSESSMENT — SOCIAL DETERMINANTS OF HEALTH (SDOH): HOW OFTEN DO YOU GET TOGETHER WITH FRIENDS OR RELATIVES?: PATIENT DECLINED

## 2024-10-24 ASSESSMENT — PAIN SCALES - GENERAL: PAINLEVEL_OUTOF10: NO PAIN (0)

## 2024-10-24 NOTE — NURSING NOTE
Chief Complaint   Patient presents with    Physical       Initial /74   Pulse 63   Temp 97.3  F (36.3  C) (Tympanic)   Resp 16   Ht 1.829 m (6')   Wt 95.9 kg (211 lb 6.4 oz)   SpO2 99%   BMI 28.67 kg/m   Estimated body mass index is 28.67 kg/m  as calculated from the following:    Height as of this encounter: 1.829 m (6').    Weight as of this encounter: 95.9 kg (211 lb 6.4 oz).  Medication Review: complete    The next two questions are to help us understand your food security.  If you are feeling you need any assistance in this area, we have resources available to support you today.          10/23/2023   SDOH- Food Insecurity   Within the past 12 months, did you worry that your food would run out before you got money to buy more? N    Within the past 12 months, did the food you bought just not last and you didn t have money to get more? N        Patient-reported         Health Care Directive:  Patient does not have a Health Care Directive: Discussed advance care planning with patient; however, patient declined at this time.    Michelle Hughes LPN

## 2024-10-24 NOTE — PATIENT INSTRUCTIONS
Blood pressure is well controlled.     Medications refilled.   Labs are pending.       Use Monistat or similar anti-fungal cream or anti-fungal medicated shampoo..... for the yousif-oral dermatitis as needed.       Deer tick bite -- on back of left thigh - due to short duration of attachment, no additional treatment needed at this time.         Immunization History   Administered Date(s) Administered    COVID-19 12+ (Pfizer) 10/24/2023    COVID-19 Bivalent 12+ (Pfizer) 10/11/2022    COVID-19 MONOVALENT 12+ (Pfizer) 03/08/2021, 04/05/2021, 12/03/2021    COVID-19 Monovalent 12+ (Pfizer 2022) 05/04/2022    Flu, Unspecified 10/06/2017, 10/19/2018    Influenza (IIV3) PF 01/13/2013, 10/06/2017    Influenza Vaccine 18-64 (Flublok) 10/19/2018, 09/30/2021, 10/11/2022, 10/24/2023    Influenza Vaccine >6 months,quad, PF 10/06/2017, 10/19/2018, 10/04/2019, 09/30/2020    TDAP (Adacel,Boostrix) 05/06/2021    TDAP Vaccine (Boostrix) 12/12/2017    Td (Adult), Adsorbed 12/09/2010    Zoster recombinant adjuvanted (SHINGRIX) 10/14/2019, 12/16/2019        Consider:  -- Annual Flu / Influenza vaccination - Every Fall (Starting about October 1st)    Consider:  -- COVID-19 Vaccination shot -- TWICE Yearly (Spring and Fall)      -- Flu shot today.   -- COVID shot today.     -- Pneumonia / Pneumococcal PCV vaccines are done / completed.       -- Consider anything that is putting pressure on the right hip/outer thigh... causing your hip bursitis.       Return in approximately 1 year, or sooner as needed for follow-up with Dr. Fay.  - Annual Follow-up / Physical    Clinic : [phone]  Appointment line: [phone]

## 2024-10-24 NOTE — PROGRESS NOTES
Assessment & Plan     ICD-10-CM    1. Annual physical exam  Z00.00       2. Vaccine counseling  Z71.85       3. Benign essential hypertension  I10 hydrochlorothiazide (HYDRODIURIL) 25 MG tablet     metoprolol succinate ER (TOPROL XL) 100 MG 24 hr tablet      4. Mixed hyperlipidemia  E78.2 rosuvastatin (CRESTOR) 10 MG tablet     Lipid Profile      5. GANESH (obstructive sleep apnea) - s/p INSPIRE device implant - 7/11/2023 - [facility]  G47.33       6. Allergic rhinitis, unspecified seasonality, unspecified trigger  J30.9 montelukast (SINGULAIR) 10 MG tablet      7. Plantar fasciitis  M72.2 nabumetone (RELAFEN) 750 MG tablet      8. Erectile dysfunction, unspecified erectile dysfunction type  N52.9 sildenafil (REVATIO) 20 MG tablet      9. Benign prostatic hyperplasia with urinary frequency  N40.1 tamsulosin (FLOMAX) 0.4 MG capsule    R35.0       10. Encounter for screening for malignant neoplasm of prostate  Z12.5 Prostate Specific Antigen Screen      11. Needs flu shot  Z23 INFLUENZA VACCINE TRIVALENT(FLUBLOK)      12. Need for COVID-19 vaccine  Z23 COVID-19 12+ (PFIZER)      13. Perioral dermatitis  L71.0          Patient presents for annual physical examination as well as follow-up multiple issues.    Flu shot given today.  COVID shot given today.    Allergic rhinitis, ongoing.  Finds Singulair helpful, takes regularly.  Needs refills.    Plantar fasciitis, intermittent flareups.  Still using Relafen intermittently as needed.  Needs refills.    Erectile dysfunction, chronic, stable.  Ongoing.  Using Viagra/sildenafil as needed.  Tolerating well.  Needs refills.    BPH with urinary frequency.  Has had improvement in symptoms.  Doing well with Flomax.  No medication side effects reported.  Needs refills.    Prostate lab cancer screening, check PSA levels.    Perioral dermatitis, recurrent.  Recommend topical antifungal cream or medicated antifungal shampoo.    HYPERTENSION - Ongoing. Blood pressure is currently  well controlled.  Medication side effects: None. Denies syncope or presyncope.  Continue current medications.   Medication list reviewed/updated. Refills completed as needed.      MIXED HYPERLIPIDEMIA.  Ongoing. LDL is at goal: Yes. Triglycerides are at goal: Yes.    Medication side effects reported: None.   Continue current medications for now. Medication list reviewed/updated. Refills completed as needed.  Recent Labs   Lab Test 10/24/24  0839 10/24/23  0901   CHOL 190 201*   HDL 84 74   LDL 91 90   TRIG 74 187*        Sleep Apnea, chronic, ongoing.  Now with INSPIRE implant. Finds very helpful.      Vaccine counseling completed.  Encourage routine / annual vaccinations.            BMI  Estimated body mass index is 28.67 kg/m  as calculated from the following:    Height as of this encounter: 1.829 m (6').    Weight as of this encounter: 95.9 kg (211 lb 6.4 oz).       Counseling  Appropriate preventive services were addressed with this patient via screening, questionnaire, or discussion as appropriate for fall prevention, nutrition, physical activity, Tobacco-use cessation, social engagement, weight loss and cognition.  Checklist reviewing preventive services available has been given to the patient.  Reviewed patient's diet, addressing concerns and/or questions.         Return in about 1 year (around 10/24/2025) for Annual Physical Exam.      Luis Fay MD  [facility]    Review of Systems   Constitutional:  Negative for chills and fever.   HENT:  Positive for hearing loss. Negative for congestion, ear pain and sore throat.    Eyes:  Negative for pain and visual disturbance.   Respiratory:  Positive for apnea (GANESH (obstructive sleep apnea) - s/p INSPIRE device implant - 7/11/2023 - [facility]). Negative for cough and shortness of breath.    Cardiovascular:  Negative for chest pain, palpitations and peripheral edema.   Gastrointestinal:  Negative for abdominal pain, constipation,  diarrhea, heartburn, hematochezia and nausea.   Genitourinary:  Negative for dysuria, frequency, genital sores, hematuria, impotence, penile discharge and urgency.   Musculoskeletal:  Positive for arthralgias (right greater trochanteric bursitis, intermittently painful). Negative for joint swelling and myalgias.   Skin:  Negative for rash.   Allergic/Immunologic: Negative for immunocompromised state.   Neurological:  Negative for dizziness, weakness, headaches and paresthesias.   Hematological:  Does not bruise/bleed easily.   Psychiatric/Behavioral:  Negative for mood changes. The patient is nervous/anxious.          Preventive Care Visit  [facility]  Luis Fay MD, Internal Medicine  Oct 24, 2024        Page Barlow is a 59 year old, presenting for the following:  Physical        10/24/2024     7:55 AM   Additional Questions   Roomed by Tha Hughes LPN          History of Present Illness       Reason for visit:  Annual physical   He is taking medications regularly.          Health Care Directive  Patient does not have a Health Care Directive: Discussed advance care planning with patient; however, patient declined at this time.       No data to display                  10/23/2023   Nutrition   Three or more servings of calcium each day? No   Diet: regular (no restrictions)            10/11/2022   Exercise   Frequency of exercise: None            10/23/2023   Social Factors   Worry food won't last until get money to buy more No   Food not last or not have enough money for food? No   Do you have housing? (Housing is defined as stable permanent housing and does not include staying ouside in a car, in a tent, in an abandoned building, in an overnight shelter, or couch-surfing.) Yes   Are you worried about losing your housing? No   Lack of transportation? No   Unable to get utilities (heat,electricity)? No            10/23/2023   Dental   Dentist two times every year? Yes                Today's PHQ-2 Score:       10/23/2024    10:38 AM   PHQ-2 ( 1999 [employer])   Q1: Little interest or pleasure in doing things 0    Q2: Feeling down, depressed or hopeless 0    PHQ-2 Score 0    Q1: Little interest or pleasure in doing things Not at all   Q2: Feeling down, depressed or hopeless Not at all   PHQ-2 Score 0       Patient-reported           10/23/2023   Substance Use   Alcohol more than 3/day or more than 7/wk Yes   How often do you have a drink containing alcohol 4 or more times a week   How many alcohol drinks on typical day 3 or 4   How often do you have 5+ drinks at one occasion Weekly   Audit 2/3 Score 4   How often not able to stop drinking once started Less than monthly   How often failed to do what normally expected Less than monthly   How often needed first drink in am after a heavy drinking session Never   How often feeling of guilt or remorse after drinking Less than monthly   How often unable to remember what happened the night before Less than monthly   Have you or someone else been injured because of your drinking No   Has anyone been concerned or suggested you cut down on drinking Yes, during the last year   TOTAL SCORE - AUDIT 16        Social History     Tobacco Use    Smoking status: Never    Smokeless tobacco: Current     Types: Snuff   Vaping Use    Vaping status: Never Used   Substance Use Topics    Alcohol use: Yes     Comment: 6 pack of beer per week on average    Drug use: Never       Last PSA:   Prostate Specific Antigen Screen   Date Value Ref Range Status   10/24/2023 3.70 0.00 - 4.50 ng/mL Final   09/30/2021 1.72 0.00 - 4.00 ug/L Final     ASCVD Risk   The 10-year ASCVD risk score (Radha SANDOVAL, et al., 2019) is: 6.8%    Values used to calculate the score:      Age: 59 years      Sex: Male      Is Non- : No      Diabetic: No      Tobacco smoker: No      Systolic Blood Pressure: 114 mmHg      Is BP treated: Yes      HDL Cholesterol: 74 mg/dL       Total Cholesterol: 201 mg/dL           Reviewed and updated as needed this visit by Provider   Tobacco  Allergies  Meds  Problems  Med Hx  Surg Hx  Fam Hx                     Objective    Exam  /74   Pulse 63   Temp 97.3  F (36.3  C) (Tympanic)   Resp 16   Ht 1.829 m (6')   Wt 95.9 kg (211 lb 6.4 oz)   SpO2 99%   BMI 28.67 kg/m     Estimated body mass index is 28.67 kg/m  as calculated from the following:    Height as of this encounter: 1.829 m (6').    Weight as of this encounter: 95.9 kg (211 lb 6.4 oz).    Physical Exam  Constitutional:       General: He is not in acute distress.     Appearance: He is well-developed. He is obese. He is not diaphoretic.   Eyes:      General: No scleral icterus.     Conjunctiva/sclera: Conjunctivae normal.   Neck:      Vascular: No carotid bruit.   Cardiovascular:      Rate and Rhythm: Normal rate and regular rhythm.      Pulses: Normal pulses.   Pulmonary:      Effort: Pulmonary effort is normal.      Breath sounds: Normal breath sounds.   Abdominal:      Palpations: Abdomen is soft.      Tenderness: There is no abdominal tenderness.   Musculoskeletal:         General: Tenderness (right greater trochanteric bursitis tenderness to palpation) present. No deformity.      Right lower leg: No edema.      Left lower leg: No edema.   Skin:     General: Skin is warm and dry.      Coloration: Skin is not jaundiced.      Findings: Rash (mild yousif-oral dermatitis) present.      Comments: Tick bite of posterior left thigh, no signs of cellulitis.   Neurological:      Mental Status: He is alert. Mental status is at baseline.   Psychiatric:         Mood and Affect: Mood normal.         Behavior: Behavior normal.               Signed Electronically by: Luis Fay MD

## 2024-10-29 DIAGNOSIS — F10.90 ALCOHOL USE DISORDER: ICD-10-CM

## 2024-10-31 RX ORDER — NALTREXONE HYDROCHLORIDE 50 MG/1
50 TABLET, FILM COATED ORAL DAILY
Qty: 90 TABLET | Refills: 3 | Status: SHIPPED | OUTPATIENT
Start: 2024-10-31

## 2024-10-31 NOTE — TELEPHONE ENCOUNTER
[facility] sent Rx request for the following:      Requested Prescriptions   Pending Prescriptions Disp Refills    naltrexone (DEPADE/REVIA) 50 MG tablet [Pharmacy Med Name: Naltrexone HCl 50 MG Oral Tablet (Depade, Revia)] 109 tablet 0     Sig: Take 1 Tablet by mouth one time a day.       There is no refill protocol information for this order        Last Prescription Date:   8/6/24  Last Fill Qty/Refills:         109, R-0    Last Office Visit:              10/24/24 (Px)   Future Office visit:           10/28/25    Unable to complete prescription refill per RN Medication Refill Policy. Alicia Dillon RN .............. 10/31/2024  4:21 PM

## 2024-12-19 ENCOUNTER — MYC MEDICAL ADVICE (OUTPATIENT)
Dept: INTERNAL MEDICINE | Facility: OTHER | Age: 59
End: 2024-12-19
Payer: COMMERCIAL

## 2025-04-14 ENCOUNTER — MYC MEDICAL ADVICE (OUTPATIENT)
Dept: INTERNAL MEDICINE | Facility: OTHER | Age: 60
End: 2025-04-14
Payer: COMMERCIAL

## 2025-04-16 ENCOUNTER — OFFICE VISIT (OUTPATIENT)
Dept: FAMILY MEDICINE | Facility: OTHER | Age: 60
End: 2025-04-16
Attending: FAMILY MEDICINE
Payer: COMMERCIAL

## 2025-04-16 VITALS
HEART RATE: 90 BPM | OXYGEN SATURATION: 99 % | WEIGHT: 205 LBS | BODY MASS INDEX: 27.8 KG/M2 | TEMPERATURE: 97.6 F | SYSTOLIC BLOOD PRESSURE: 168 MMHG | DIASTOLIC BLOOD PRESSURE: 70 MMHG | RESPIRATION RATE: 16 BRPM

## 2025-04-16 DIAGNOSIS — M25.551 GREATER TROCHANTERIC PAIN SYNDROME OF RIGHT LOWER EXTREMITY: Primary | ICD-10-CM

## 2025-04-16 PROCEDURE — 250N000011 HC RX IP 250 OP 636: Mod: JZ | Performed by: FAMILY MEDICINE

## 2025-04-16 PROCEDURE — 250N000009 HC RX 250: Performed by: FAMILY MEDICINE

## 2025-04-16 RX ORDER — LIDOCAINE HYDROCHLORIDE 10 MG/ML
5 INJECTION, SOLUTION INFILTRATION; PERINEURAL ONCE
Status: COMPLETED | OUTPATIENT
Start: 2025-04-16 | End: 2025-04-16

## 2025-04-16 RX ORDER — DEXAMETHASONE SODIUM PHOSPHATE 10 MG/ML
10 INJECTION, SOLUTION INTRAMUSCULAR; INTRAVENOUS ONCE
Status: COMPLETED | OUTPATIENT
Start: 2025-04-16 | End: 2025-04-16

## 2025-04-16 RX ADMIN — LIDOCAINE HYDROCHLORIDE 5 ML: 10 INJECTION, SOLUTION INFILTRATION; PERINEURAL at 15:27

## 2025-04-16 RX ADMIN — DEXAMETHASONE SODIUM PHOSPHATE 10 MG: 10 INJECTION, SOLUTION INTRAMUSCULAR; INTRAVENOUS at 15:27

## 2025-04-16 ASSESSMENT — PAIN SCALES - GENERAL: PAINLEVEL_OUTOF10: SEVERE PAIN (7)

## 2025-04-16 NOTE — PROGRESS NOTES
Sports Medicine Office Note    HPI:  60-year-old male coming in for evaluation of right hip pain.  His pain has been present intermittently for 5-6 years.  No inciting event or injury.  He has had approximately 5 injections into the right trochanteric bursa which provide temporary relief.  His last injection was 4/25/2024.  This provided symptom improvement up until 3-4 months ago.  He rates his pain at 7/10.  He characterized the pain as achy.  He localized the pain to the lateral aspect of the hip.  He has difficulty with lifting, standing, and going up/down stairs.  He was previously seen at outside facilities and was prescribed exercises to do at home.  He reports being inconsistent with doing these but still has them available.  He does have some associated swelling in this area.  No erythema or redness.      EXAM:  BP (!) 168/70   Pulse 90   Temp 97.6  F (36.4  C) (Temporal)   Resp 16   Wt 93 kg (205 lb)   SpO2 99%   BMI 27.80 kg/m    MUSCULOSKELETAL EXAM:  RIGHT HIP  Inspection:  -No gross deformity    Tenderness to palpation of the:  -Anterior hip joint:  Negative  -ASIS:  Negative  -Greater trochanter: Positive  -Pelvic ala:  Negative  -Gluteus medius/minimus tendinous insertion: Positive    Range of Motion:  -Passive flexion:  120    Strength:  -Knee extension:  5/5  -Knee flexion:  5/5  -Hip abduction:  5/5  -Hip adduction:  5/5  -Hip flexion:  5/5  -Hip extension:  5/5    Special Tests:  -Logrolling maneuver:  Negative  -SETH: Positive bilaterally  -FADIR:  Negative  -Scour test:  Negative  -Stinchfield test:  Negative    Other:  -Intact sensation to light touch distally.  -No signs of cyanosis. Normal skin temperature of the lower extremity.  -Knee/foot/ankle:  No gross deformity. Full range of motion.  -Left hip:  No gross deformity. No palpable tenderness. Normal strength and ROM.      IMAGING:  None      ASSESSMENT/PLAN:  Diagnoses and all orders for this visit:  Greater trochanteric pain  syndrome of right lower extremity  -     DRAIN/INJECT LARGE JOINT/BURSA  -     dexAMETHasone PF (DECADRON) injection 10 mg  -     lidocaine 1 % injection 5 mL    60-year-old male with greater trochanteric pain syndrome of the right hip.  Radiologist report of outside imaging from April 2024 was reviewed in the office.  We reviewed pathophysiology for this condition and oftentimes pain in this area is due to degenerative tendinopathy as opposed to inflammatory bursitis.  We discussed treatment options to include activity modification, therapy, ice, topical medications, oral medications, injections, and surgery.  After reviewing the risks/benefits, the patient like to proceed with a greater trochanteric bursa CSI.  See procedure note below:    PROCEDURE:  Injection of the Right Greater Trochanteric Bursa     PROCEDURAL PAUSE:    A procedural pause was conducted to verify:  correct patient identity, procedure to be performed, and as applicable, correct side, site, and correct patient position.      INFORMED CONSENT:   I discussed the risks, possible benefits, and alternatives to injection.  Following denial of allergy and review of potential side effects and complications (including but not necessarily limited to infection, allergic reaction, fat necrosis, skin depigmentation, local tissue breakdown, systemic effects of corticosteroids, elevation of blood glucose, injury to soft tissue and/or nerves, and seizure), all questions were answered, and consent was given to proceed.  Patient verbalized understanding.      PROCEDURE DETAILS:    Side and site were marked, and a time out was performed to verify appropriate patient identifiers.  Following this the right lateral hip was prepped with chlorhexidine.  Utilizing a 3-inch spinal needle the greater trochanteric bursa was injected using 5mL of 1% Lidocaine and 1mL dexamethasone (10mg/mL).  0mL was wasted.     The patient tolerated the procedure without complication and  was discharged in good condition after a short observation period.  The patient was instructed to contact me regarding any questions pertaining to the procedure.      DIAGNOSIS:    -Successful injection of the right greater trochanteric bursa without immediate complication      PLAN:   -Post-procedure care reviewed, including avoiding submersion of the injection site for 48 hours   -Return precautions reviewed for signs/symptoms that would be concerning for infection   -The patient was instructed to ice and take APAP this evening if needed  -Begin home exercises (patient has previous handouts that he will work through)  -Return to clinic as needed  - If symptoms are not improving with the above interventions, recommend patient call for a formal PT referral      Oleksandr Quinonez MD  4/16/2025  2:57 PM    Total time spent with this patient was 41 minutes which included chart review, visualization and independent interpretation of images, time spent with the patient, and documentation.    Procedure time:  3 minute(s)

## (undated) DEVICE — ENDO KIT COMPLIANCE DYKENDOCMPLY

## (undated) DEVICE — TUBING SUCTION 10'X3/16" N510

## (undated) DEVICE — ENDO BRUSH CHANNEL MASTER CLEANING 2-4.2MM BW-412T

## (undated) DEVICE — SUCTION MANIFOLD NEPTUNE 2 SYS 4 PORT 0702-020-000

## (undated) DEVICE — ESU GROUND PAD ADULT W/CORD E7507

## (undated) DEVICE — ESU ENDO FORCEP BX HOT FD-210U

## (undated) DEVICE — SOL WATER 1500ML

## (undated) RX ORDER — LIDOCAINE HYDROCHLORIDE 10 MG/ML
INJECTION, SOLUTION INFILTRATION; PERINEURAL
Status: DISPENSED
Start: 2023-11-16

## (undated) RX ORDER — LIDOCAINE HYDROCHLORIDE 10 MG/ML
INJECTION, SOLUTION INFILTRATION; PERINEURAL
Status: DISPENSED
Start: 2025-04-16

## (undated) RX ORDER — PROPOFOL 10 MG/ML
INJECTION, EMULSION INTRAVENOUS
Status: DISPENSED
Start: 2021-03-29

## (undated) RX ORDER — LIDOCAINE HYDROCHLORIDE 20 MG/ML
INJECTION, SOLUTION EPIDURAL; INFILTRATION; INTRACAUDAL; PERINEURAL
Status: DISPENSED
Start: 2021-03-29

## (undated) RX ORDER — DEXAMETHASONE SODIUM PHOSPHATE 10 MG/ML
INJECTION, SOLUTION INTRAMUSCULAR; INTRAVENOUS
Status: DISPENSED
Start: 2025-04-16

## (undated) RX ORDER — TRIAMCINOLONE ACETONIDE 40 MG/ML
INJECTION, SUSPENSION INTRA-ARTICULAR; INTRAMUSCULAR
Status: DISPENSED
Start: 2023-11-16

## (undated) RX ORDER — BUPIVACAINE HYDROCHLORIDE 5 MG/ML
INJECTION, SOLUTION EPIDURAL; INTRACAUDAL
Status: DISPENSED
Start: 2023-11-16

## (undated) RX ORDER — TETRACAINE HYDROCHLORIDE 5 MG/ML
SOLUTION OPHTHALMIC
Status: DISPENSED
Start: 2021-05-06